# Patient Record
Sex: FEMALE | Race: WHITE | NOT HISPANIC OR LATINO | Employment: FULL TIME | URBAN - METROPOLITAN AREA
[De-identification: names, ages, dates, MRNs, and addresses within clinical notes are randomized per-mention and may not be internally consistent; named-entity substitution may affect disease eponyms.]

---

## 2024-09-06 ENCOUNTER — TELEPHONE (OUTPATIENT)
Dept: OBSTETRICS AND GYNECOLOGY | Facility: CLINIC | Age: 29
End: 2024-09-06

## 2024-09-06 NOTE — TELEPHONE ENCOUNTER
Let pt know that per clinical manager we cannot send in any pain medication since we have not seen her in office yet. She VU and states she has appt with her PCP.

## 2024-09-06 NOTE — TELEPHONE ENCOUNTER
Spoke with pt and she states she has an abscess tooth and had to have 1 pulled and has to have another pulled next week. She reports being in a lot of pain but her dentist did not prescribe any pain medication to help just recommended she call us. She reports taking 1000mg of tylenol every 6 hours and she is still having pain. I let her know it is unlikely that we can prescribe any medication since she has not been seen in the office but I would speak with provider and call back with recommendation. She VU

## 2024-09-06 NOTE — TELEPHONE ENCOUNTER
Pt called and stated she has her new ob appt with us oct 2nd however she is having dental issues right now and was told she needed to call her obgyn office to get prescribed anything stronger than ibuprofen. Would like a call back to discuss if there is anything she is able to take.

## 2024-09-07 ENCOUNTER — HOSPITAL ENCOUNTER (EMERGENCY)
Facility: HOSPITAL | Age: 29
Discharge: HOME | End: 2024-09-07
Attending: EMERGENCY MEDICINE
Payer: COMMERCIAL

## 2024-09-07 VITALS
HEART RATE: 82 BPM | DIASTOLIC BLOOD PRESSURE: 78 MMHG | SYSTOLIC BLOOD PRESSURE: 147 MMHG | OXYGEN SATURATION: 100 % | RESPIRATION RATE: 16 BRPM | TEMPERATURE: 98.5 F | WEIGHT: 197 LBS | HEIGHT: 62 IN | BODY MASS INDEX: 36.25 KG/M2

## 2024-09-07 DIAGNOSIS — K08.89 PAIN OF TOOTH SOCKET: ICD-10-CM

## 2024-09-07 DIAGNOSIS — K02.9 PAIN DUE TO DENTAL CARIES: Primary | ICD-10-CM

## 2024-09-07 PROCEDURE — 93005 ELECTROCARDIOGRAM TRACING: CPT | Performed by: EMERGENCY MEDICINE

## 2024-09-07 PROCEDURE — 25010000002 BUPIVACAINE (PF) 0.5 % SOLUTION: Performed by: EMERGENCY MEDICINE

## 2024-09-07 PROCEDURE — 99283 EMERGENCY DEPT VISIT LOW MDM: CPT

## 2024-09-07 RX ORDER — BUPIVACAINE HYDROCHLORIDE 5 MG/ML
10 INJECTION, SOLUTION EPIDURAL; INTRACAUDAL ONCE
Status: COMPLETED | OUTPATIENT
Start: 2024-09-07 | End: 2024-09-07

## 2024-09-07 RX ORDER — PRENATAL WITH FERROUS FUM AND FOLIC ACID 3080; 920; 120; 400; 22; 1.84; 3; 20; 10; 1; 12; 200; 27; 25; 2 [IU]/1; [IU]/1; MG/1; [IU]/1; MG/1; MG/1; MG/1; MG/1; MG/1; MG/1; UG/1; MG/1; MG/1; MG/1; MG/1
1 TABLET ORAL DAILY
COMMUNITY

## 2024-09-07 RX ORDER — OXYCODONE AND ACETAMINOPHEN 5; 325 MG/1; MG/1
1 TABLET ORAL EVERY 6 HOURS PRN
Qty: 6 TABLET | Refills: 0 | Status: SHIPPED | OUTPATIENT
Start: 2024-09-07

## 2024-09-07 RX ADMIN — BUPIVACAINE HYDROCHLORIDE 10 ML: 5 INJECTION, SOLUTION EPIDURAL; INTRACAUDAL at 10:31

## 2024-09-07 RX ADMIN — TOPICAL ANESTHETIC 1 SPRAY: 200 SPRAY DENTAL; PERIODONTAL at 10:31

## 2024-09-07 NOTE — Clinical Note
[facility] EMERGENCY DEPARTMENT [place]  [address]  [facility] [address]  Phone: [phone]  Fax: [phone]    Kathy Anthony was seen and treated in our emergency department on 9/7/2024.  She may return to work on 09/10/2024.         Thank you for choosing [facility].    Salvador Medellin MD

## 2024-09-07 NOTE — FSED PROVIDER NOTE
"Subjective  History of Present Illness:    Patient presents the emergency department with dental pain.  The patient reports that she has had this pain over the past week she is unsure of aggravating factors.  Patient had a dental extraction to the maxillary incisors.  Patient reports pain in this area as well as pain in the premolar on the mandibular side.  Patient appears to have had a filling in that tooth.  Patient reports severe dental pain she advises that her primary care doctor prescribed her hydrocodone; however the pharmacy did not have it available.  Patient reports that she went to [facility] but they advised her that they could do nothing for her pain.      Nurses Notes reviewed and agree, including vitals, allergies, social history and prior medical history.     REVIEW OF SYSTEMS:   Review of Systems   HENT:  Positive for dental problem.        History reviewed. No pertinent past medical history.    Allergies:    Patient has no known allergies.      History reviewed. No pertinent surgical history.      Social History     Socioeconomic History    Marital status:          History reviewed. No pertinent family history.    Objective  Physical Exam:  /78   Pulse 82   Temp 98.5 °F (36.9 °C) (Oral)   Resp 16   Ht 157.5 cm (62\")   Wt 89.4 kg (197 lb)   SpO2 100%   BMI 36.03 kg/m²      Physical Exam  Vitals and nursing note reviewed.   Constitutional:       Appearance: Normal appearance.   HENT:      Right Ear: External ear normal.      Left Ear: External ear normal.      Nose: Nose normal.      Mouth/Throat:      Mouth: Mucous membranes are moist.     Eyes:      Extraocular Movements: Extraocular movements intact.   Cardiovascular:      Rate and Rhythm: Normal rate and regular rhythm.   Pulmonary:      Effort: Pulmonary effort is normal.      Breath sounds: Normal breath sounds.   Musculoskeletal:         General: Normal range of motion.   Skin:     General: Skin is warm and dry. "   Neurological:      General: No focal deficit present.      Mental Status: She is alert.   Psychiatric:         Mood and Affect: Mood normal.         Behavior: Behavior normal.         Thought Content: Thought content normal.         Nerve Block    Date/Time: 9/7/2024 11:41 AM    Performed by: Salvador Medellin MD  Authorized by: Salvador Medellin MD      I performed a inferior alveolar nerve block to the left side.  I instilled 2 mL of bupivacaine 0.5% in the area.  Patient tolerated the procedure well and reported significant relief.    I performed a periapical dental block in the region of the prior dental extraction.  I instilled 2 mL of bupivacaine 0.5% in the area.  The patient reported significant relief.  T    ED Course:      I had a prolonged discussion with the patient regarding the risks and benefits of opioids during pregnancy.  I advised the patient that the least known safe drug for pain in pregnancy as Tylenol; however we did discuss that given the amount of severe pain that the patient is in that the benefits of a few oxycodone likely outweigh the risk.  Patient understands that I did not advise the patient that this was safe for pregnancy.  Patient is currently taking antibiotics therefore I doubt that further antibiotics would help the patient's pain.     Lab Results (last 24 hours)       ** No results found for the last 24 hours. **             No radiology results from the last 24 hrs       MDM     Amount and/or Complexity of Data Reviewed  Tests in the medicine section of CPT®: reviewed        Initial impression of presenting illness: Dental pain    DDX: includes but is not limited to: Periapical abscess, pulpitis, alveolar nerve pain    Plan    Patient arrives ambulatory with vitals interpreted by myself.     Pertinent features from physical exam: Patient very uncomfortable and tearful.    Initial diagnostic plan:     Results from initial plan were reviewed and interpreted by me revealing      Diagnostic information from other sources:     Interventions / Re-evaluation: Patient reports significant relief from nerve block    Medications   bupivacaine (PF) (MARCAINE) 0.5 % injection 10 mL (10 mL Injection Given 9/7/24 1031)   benzocaine (HURRICAINE) 20 % liquid solution 1 spray (1 spray Mouth/Throat Given 9/7/24 1031)       Results/clinical rationale were discussed with patient and patient's spouse    Consultations/Discussion of results with other physicians:     Data interpreted: Nursing notes reviewed, vital signs reviewed.     reviewed independently interpreted by me.  EKG independently interpreted by me.  O2 saturation:    Counseling: Discussed the results above with the patient regarding need for discharged home. Return precautions were given to patient and patient's spouse.  Patient understands and agrees plan of care.      -----  ED Disposition       ED Disposition   Discharge    Condition   Stable    Comment   --             Final diagnoses:   Pain due to dental caries   Pain of tooth socket      Your Follow-Up Providers       Go to  [facility] EMERGENCY DEPARTMENT [place].    Specialty: Emergency Medicine  Follow up details: If symptoms worsen  [address]  [facility] [address] [phone]             Dentist of choice In 3 days.                       Contact information for after-discharge care    Follow-up information has not been specified.                    Your medication list        START taking these medications        Instructions Last Dose Given Next Dose Due   oxyCODONE-acetaminophen 5-325 MG per tablet  Commonly known as: PERCOCET      Take 1 tablet by mouth Every 6 (Six) Hours As Needed for Moderate Pain or Severe Pain for up to 6 doses.              CONTINUE taking these medications        Instructions Last Dose Given Next Dose Due   Prenatal 27-1 27-1 MG tablet tablet      Take 1 tablet by mouth Daily.                 Where to Get  Your Medications        These medications were sent to [facility]/pharmacy #[id number] - [place], [place] - [address] - [phone] PH - [phone] FX  [address], [facility] [address]      Hours: 24-hours Phone: [phone]   oxyCODONE-acetaminophen 5-325 MG per tablet

## 2024-09-14 LAB
QT INTERVAL: 372 MS
QTC INTERVAL: 407 MS

## 2024-10-02 ENCOUNTER — INITIAL PRENATAL (OUTPATIENT)
Dept: OBSTETRICS AND GYNECOLOGY | Facility: CLINIC | Age: 29
End: 2024-10-02
Payer: COMMERCIAL

## 2024-10-02 VITALS — BODY MASS INDEX: 36.51 KG/M2 | SYSTOLIC BLOOD PRESSURE: 112 MMHG | DIASTOLIC BLOOD PRESSURE: 82 MMHG | WEIGHT: 199.6 LBS

## 2024-10-02 DIAGNOSIS — Z3A.09 9 WEEKS GESTATION OF PREGNANCY: Primary | ICD-10-CM

## 2024-10-02 DIAGNOSIS — R11.0 NAUSEA WITHOUT VOMITING: ICD-10-CM

## 2024-10-02 LAB
APPEARANCE UR: CLEAR
BACTERIA #/AREA URNS HPF: ABNORMAL /HPF
BILIRUB UR QL STRIP: NEGATIVE
CASTS URNS MICRO: ABNORMAL
COLOR UR: YELLOW
EPI CELLS #/AREA URNS HPF: ABNORMAL /HPF
GLUCOSE UR QL STRIP: NEGATIVE
HGB UR QL STRIP: NEGATIVE
KETONES UR QL STRIP: NEGATIVE
LEUKOCYTE ESTERASE UR QL STRIP: ABNORMAL
NITRITE UR QL STRIP: NEGATIVE
PH UR STRIP: 7 [PH] (ref 5–8)
PROT UR QL STRIP: NEGATIVE
RBC #/AREA URNS HPF: ABNORMAL /HPF
SP GR UR STRIP: 1.02 (ref 1–1.03)
UROBILINOGEN UR STRIP-MCNC: ABNORMAL MG/DL
WBC #/AREA URNS HPF: ABNORMAL /HPF

## 2024-10-02 RX ORDER — DOXYLAMINE SUCCINATE AND PYRIDOXINE HYDROCHLORIDE, DELAYED RELEASE TABLETS 10 MG/10 MG 10; 10 MG/1; MG/1
2 TABLET, DELAYED RELEASE ORAL
Qty: 60 TABLET | Refills: 5 | Status: SHIPPED | OUTPATIENT
Start: 2024-10-02

## 2024-10-02 RX ORDER — SENNOSIDES 8.6 MG
650 CAPSULE ORAL EVERY 8 HOURS PRN
COMMUNITY

## 2024-10-02 RX ORDER — FAMOTIDINE 40 MG/1
40 TABLET, FILM COATED ORAL DAILY
COMMUNITY

## 2024-10-02 NOTE — PROGRESS NOTES
Initial ob visit     CC- Here for care of pregnancy        Kathy Anthony is a 29 y.o. female, , who presents for her first obstetrical visit.  Patient's last menstrual period was 2024.. Her JOCY is 2025, by Last Menstrual Period. Current GA is 9w6d.     Initial positive test date : 24, UPT        Her periods are every 28  days.  Prior obstetric issues: G1 PP Hemorrhage  Patient's past medical history is significant for:  anxiety, depression, GERD, post partum hemorrhage   Family history of genetic issues (includes FOB): Trisomy 18 PAunt  Prior infections concerning in pregnancy (Rash, fever in last 2 weeks): No  Varicella Hx - history of chicken pox  Prior testing for Cystic Fibrosis Carrier or Sickle Cell Trait- None  Prepregnancy BMI - Body mass index is 36.51 kg/m².  History of STD: no  Hx of HSV for patient or partner: no  Ultrasound Today: yes    OB History    Para Term  AB Living   4 3 3 0 0 3   SAB IAB Ectopic Molar Multiple Live Births   0 0 0 0 0 3      # Outcome Date GA Lbr Oscar/2nd Weight Sex Type Anes PTL Lv   4 Current            3 Term 10/19/17 39w4d 06:46 / 00:08 3145 g (6 lb 14.9 oz) F Vaginal unsp EPI N JAY   2 Term 16 39w1d 03:14 / 00:16 3371 g (7 lb 6.9 oz) F Vag-Spont EPI Y JAY   1 Term 11 42w0d 16:00 / 00:45 3345 g (7 lb 6 oz) F Vag-Spont EPI N JAY      Complications: Other Excessive Bleeding, Postpartum Hemorrhage       Additional Pertinent History   Last Pap : 3/15/21 Result: negative HPV: not done. Her last HPV testing was unknown.     Last Completed Pap Smear       This patient has no relevant Health Maintenance data.          History of abnormal Pap smear: no  Family history of uterine, colon, breast, or ovarian cancer: no  Feelings of Anxiety or Depression: yes - anxiety and depression, off Lexapro and Wellbutrin for 6 months  Tobacco Usage?: No   Alcohol/Drug Use?: NO  Over the age of 35 at delivery: no  Genetic Screening: desires cell  free DNA  Flu Status: Declines    PMH    Current Outpatient Medications:     acetaminophen (TYLENOL) 650 MG 8 hr tablet, Take 1 tablet by mouth Every 8 (Eight) Hours As Needed for Mild Pain., Disp: , Rfl:     famotidine (PEPCID) 40 MG tablet, Take 1 tablet by mouth Daily., Disp: , Rfl:     ondansetron ODT (ZOFRAN-ODT) 4 MG disintegrating tablet, Place 1 tablet on the tongue Every 8 (Eight) Hours for nausea and vomiting. Please use sparingly during pregnancy., Disp: 16 tablet, Rfl: 0    Prenatal Vit-Fe Fumarate-FA (Prenatal 27-1) 27-1 MG tablet tablet, Take 1 tablet by mouth Daily., Disp: , Rfl:     doxylamine-pyridoxine (DICLEGIS) 10-10 MG tablet delayed-release EC tablet, Take 2 tablets by mouth every night at bedtime., Disp: 60 tablet, Rfl: 5     Past Medical History:   Diagnosis Date    Anxiety 2006    Depression 2007    GERD (gastroesophageal reflux disease) 2011    History of postpartum hemorrhage 11/2011    IBS (irritable bowel syndrome) 2017        Past Surgical History:   Procedure Laterality Date    MOUTH SURGERY  09/12/2024    ROOT CANAL      x3    WISDOM TOOTH EXTRACTION  05/2019       Review of Systems   Review of Systems    Patient Reports: excessive nausea , takes Zofran with mild relief, requesting Diclegis    Patient Denies:excessive vomiting and vaginal bleeding  All systems reviewed and otherwise normal.    I have reviewed and agree with the HPI, ROS, and historical information as entered above. Claudia Guallpa MD      /82   Wt 90.5 kg (199 lb 9.6 oz)   LMP 07/25/2024   BMI 36.51 kg/m²     The additional following portions of the patient's history were reviewed and updated as appropriate: allergies, current medications, past family history, past medical history, past social history, past surgical history, and problem list.    Physical Exam  General:  well developed; well nourished  no acute distress  mentation appropriate   Chest/Respiratory: No labored breathing, normal respiratory  effort, normal appearance, no respiratory noises noted   Heart:  normal rate, regular rhythm,  no murmurs, rubs, or gallops   Thyroid: normal to inspection and palpation   Breasts:  Not performed.   Abdomen: soft, non-tender; no masses  no umbilical or inguinal hernias are present  no hepato-splenomegaly   Pelvis: Not performed.        Assessment and Plan    Problem List Items Addressed This Visit    None  Visit Diagnoses       9 weeks gestation of pregnancy    -  Primary    Relevant Orders    Obstetric Panel    HIV-1 / O / 2 Ag / Antibody    Urine Culture - Urine, Urine, Clean Catch    Urinalysis With Microscopic - Urine, Clean Catch    Chlamydia trachomatis, Neisseria gonorrhoeae, PCR - Urine, Urine, Clean Catch    Urine Drug Screen - Urine, Clean Catch    RPR Qualitative with Reflex to Quant    LIQUID-BASED PAP SMEAR WITH HPV GENOTYPING REGARDLESS OF INTERPRETATION (STEVEN,COR,MAD)    [id number] PLUS Core+SCA+ESS - Blood,    Nausea without vomiting                Pregnancy at 9w6d  Reviewed routine prenatal care with the office and educational materials given  Discussed options for genetic testing including first trimester nuchal translucency screen, genetic disease carrier testing, quadruple screen, and NIPT  Return in about 4 weeks (around 10/30/2024).      Claudia Guallpa MD  10/02/2024

## 2024-10-03 LAB
ABO GROUP BLD: ABNORMAL
AMPHETAMINES UR QL SCN: NEGATIVE NG/ML
BARBITURATES UR QL SCN: NEGATIVE NG/ML
BASOPHILS # BLD AUTO: 0 X10E3/UL (ref 0–0.2)
BASOPHILS NFR BLD AUTO: 0 %
BENZODIAZ UR QL SCN: NEGATIVE NG/ML
BLD GP AB SCN SERPL QL: NEGATIVE
BZE UR QL SCN: NEGATIVE NG/ML
CANNABINOIDS UR QL SCN: NEGATIVE NG/ML
CREAT UR-MCNC: 90.7 MG/DL (ref 20–300)
EOSINOPHIL # BLD AUTO: 0.1 X10E3/UL (ref 0–0.4)
EOSINOPHIL NFR BLD AUTO: 1 %
ERYTHROCYTE [DISTWIDTH] IN BLOOD BY AUTOMATED COUNT: 12 % (ref 11.7–15.4)
HBV SURFACE AG SERPL QL IA: NEGATIVE
HCT VFR BLD AUTO: 42.2 % (ref 34–46.6)
HCV IGG SERPL QL IA: NON REACTIVE
HGB BLD-MCNC: 13.1 G/DL (ref 11.1–15.9)
HIV 1+2 AB+HIV1 P24 AG SERPL QL IA: NON REACTIVE
IMM GRANULOCYTES # BLD AUTO: 0 X10E3/UL (ref 0–0.1)
IMM GRANULOCYTES NFR BLD AUTO: 0 %
LABORATORY COMMENT REPORT: NORMAL
LYMPHOCYTES # BLD AUTO: 2.2 X10E3/UL (ref 0.7–3.1)
LYMPHOCYTES NFR BLD AUTO: 23 %
MCH RBC QN AUTO: 29.9 PG (ref 26.6–33)
MCHC RBC AUTO-ENTMCNC: 31 G/DL (ref 31.5–35.7)
MCV RBC AUTO: 96 FL (ref 79–97)
METHADONE UR QL SCN: NEGATIVE NG/ML
MONOCYTES # BLD AUTO: 0.7 X10E3/UL (ref 0.1–0.9)
MONOCYTES NFR BLD AUTO: 7 %
NEUTROPHILS # BLD AUTO: 6.5 X10E3/UL (ref 1.4–7)
NEUTROPHILS NFR BLD AUTO: 69 %
OPIATES UR QL SCN: NEGATIVE NG/ML
OXYCODONE+OXYMORPHONE UR QL SCN: NEGATIVE NG/ML
PCP UR QL: NEGATIVE NG/ML
PH UR: 6.9 [PH] (ref 4.5–8.9)
PLATELET # BLD AUTO: 371 X10E3/UL (ref 150–450)
PROPOXYPH UR QL SCN: NEGATIVE NG/ML
RBC # BLD AUTO: 4.38 X10E6/UL (ref 3.77–5.28)
RH BLD: POSITIVE
RPR SER QL: NON REACTIVE
RUBV IGG SERPL IA-ACNC: <0.9 INDEX
WBC # BLD AUTO: 9.4 X10E3/UL (ref 3.4–10.8)

## 2024-10-04 LAB
C TRACH RRNA SPEC QL NAA+PROBE: NEGATIVE
N GONORRHOEA RRNA SPEC QL NAA+PROBE: NEGATIVE

## 2024-10-04 RX ORDER — NITROFURANTOIN 25; 75 MG/1; MG/1
100 CAPSULE ORAL 2 TIMES DAILY
Qty: 14 CAPSULE | Refills: 0 | Status: SHIPPED | OUTPATIENT
Start: 2024-10-04 | End: 2024-10-11

## 2024-10-04 NOTE — PROGRESS NOTES
Please notify of urine culture positive for UTI.  Antibiotic has been sent in. Awaiting final culture.

## 2024-10-07 LAB
5P15 DELETION (CRI-DU-CHAT): NOT DETECTED
BACTERIA UR CULT: ABNORMAL
BACTERIA UR CULT: ABNORMAL
CFDNA.FET/CFDNA.TOTAL SFR FETUS: NORMAL %
CITATION REF LAB TEST: NORMAL
FET 13+18+21+X+Y ANEUP PLAS.CFDNA: NEGATIVE
FET 1P36 DEL RISK WBC.DNA+CFDNA QL: NOT DETECTED
FET 22Q11.2 DEL RISK WBC.DNA+CFDNA QL: NOT DETECTED
FET CHR 11Q23 DEL PLAS.CFDNA QL: NOT DETECTED
FET CHR 15Q11 DEL PLAS.CFDNA QL: NOT DETECTED
FET CHR 21 TS PLAS.CFDNA QL: NEGATIVE
FET CHR 4P16 DEL PLAS.CFDNA QL: NOT DETECTED
FET CHR 8Q24 DEL PLAS.CFDNA QL: NOT DETECTED
FET MS X RISK WBC.DNA+CFDNA QL: NOT DETECTED
FET SEX PLAS.CFDNA DOSAGE CFDNA: NORMAL
FET TS 13 RISK PLAS.CFDNA QL: NEGATIVE
FET TS 18 RISK WBC.DNA+CFDNA QL: NEGATIVE
FET X + Y ANEUP RISK PLAS.CFDNA SEQ-IMP: NOT DETECTED
GA EST FROM CONCEPTION DATE: NORMAL D
GESTATIONAL AGE > 9:: YES
LAB DIRECTOR NAME PROVIDER: NORMAL
LAB DIRECTOR NAME PROVIDER: NORMAL
LABORATORY COMMENT REPORT: NORMAL
LIMITATIONS OF THE TEST: NORMAL
NEGATIVE PREDICTIVE VALUE: NORMAL
NOTE: NORMAL
OTHER ANTIBIOTIC SUSC ISLT: ABNORMAL
PERFORMANCE CHARACTERISTICS: NORMAL
POSITIVE PREDICTIVE VALUE: NORMAL
REF LAB TEST METHOD: NORMAL
REF LAB TEST METHOD: NORMAL
TEST PERFORMANCE INFO SPEC: NORMAL
TRIOSOMY 16: NOT DETECTED
TRISOMY 22: NOT DETECTED

## 2024-10-31 ENCOUNTER — ROUTINE PRENATAL (OUTPATIENT)
Dept: OBSTETRICS AND GYNECOLOGY | Facility: CLINIC | Age: 29
End: 2024-10-31
Payer: COMMERCIAL

## 2024-10-31 VITALS — BODY MASS INDEX: 36.91 KG/M2 | DIASTOLIC BLOOD PRESSURE: 82 MMHG | SYSTOLIC BLOOD PRESSURE: 128 MMHG | WEIGHT: 201.8 LBS

## 2024-10-31 DIAGNOSIS — O23.40 URINARY TRACT INFECTION IN MOTHER DURING PREGNANCY, ANTEPARTUM: Primary | ICD-10-CM

## 2024-10-31 DIAGNOSIS — Z34.92 PRENATAL CARE IN SECOND TRIMESTER: ICD-10-CM

## 2024-10-31 LAB
GLUCOSE UR STRIP-MCNC: NEGATIVE MG/DL
PROT UR STRIP-MCNC: NEGATIVE MG/DL

## 2024-10-31 NOTE — PROGRESS NOTES
OB FOLLOW UP  CC- Here for care of pregnancy        Kathy Anthony is a 29 y.o.  14w0d patient being seen today for her obstetrical follow up visit. Patient reports fatigue and nausea. Patient is taking Diclegis at night and is somewhat helping. Patient has not been taking Zofran.     TAMANNA for UTI today.     Her prenatal care is complicated by (and status) :   Patient Active Problem List   Diagnosis    History of postpartum hemorrhage       Genetic testing?: already completed and was normal.  NOB labs reviewed  Flu Status: Already given in current flu season  Ultrasound Today: No    ROS -   Patient Denies: leaking of fluid, vaginal bleeding, dysuria, excessive vomiting, and more than 6 contractions per hour  All other systems reviewed and are negative.     The additional following portions of the patient's history were reviewed and updated as appropriate: allergies and current medications.    I have reviewed and agree with the HPI, ROS, and historical information as entered above. Claudia Guallpa MD          /82   Wt 91.5 kg (201 lb 12.8 oz)   LMP 2024   BMI 36.91 kg/m²         EXAM:     Prenatal Vitals  BP: 128/82  Weight: 91.5 kg (201 lb 12.8 oz)   Fetal Heart Rate: pos          Urine Glucose Read-only: Negative  Urine Protein Read-only: Negative       Assessment and Plan    Problem List Items Addressed This Visit    None  Visit Diagnoses       Urinary tract infection in mother during pregnancy, antepartum    -  Primary    Relevant Orders    POC Urinalysis Dipstick (Completed)    Urine Culture - Urine, Urine, Clean Catch    Prenatal care in second trimester        Relevant Orders    POC Urinalysis Dipstick (Completed)    Urine Culture - Urine, Urine, Clean Catch            Pregnancy at 14w0d  Labs reviewed from New OB Visit.  Counseled on genetic testing, carrier status and option for NT screen  Activity and Exercise discussed.  Patient is on Prenatal vitamins  Return in about 3 weeks  (around 11/21/2024).    Claudia Guallpa MD  10/31/2024

## 2024-11-02 LAB
BACTERIA UR CULT: NORMAL
BACTERIA UR CULT: NORMAL

## 2024-11-05 ENCOUNTER — TELEPHONE (OUTPATIENT)
Dept: OBSTETRICS AND GYNECOLOGY | Facility: CLINIC | Age: 29
End: 2024-11-05
Payer: COMMERCIAL

## 2024-11-05 ENCOUNTER — HOSPITAL ENCOUNTER (EMERGENCY)
Facility: HOSPITAL | Age: 29
Discharge: HOME | End: 2024-11-05
Attending: STUDENT IN AN ORGANIZED HEALTH CARE EDUCATION/TRAINING PROGRAM | Admitting: STUDENT IN AN ORGANIZED HEALTH CARE EDUCATION/TRAINING PROGRAM
Payer: COMMERCIAL

## 2024-11-05 VITALS
HEIGHT: 62 IN | HEART RATE: 91 BPM | TEMPERATURE: 98.2 F | DIASTOLIC BLOOD PRESSURE: 71 MMHG | WEIGHT: 201 LBS | SYSTOLIC BLOOD PRESSURE: 115 MMHG | OXYGEN SATURATION: 98 % | BODY MASS INDEX: 36.99 KG/M2 | RESPIRATION RATE: 16 BRPM

## 2024-11-05 DIAGNOSIS — O21.9 NAUSEA AND VOMITING DURING PREGNANCY: Primary | ICD-10-CM

## 2024-11-05 DIAGNOSIS — O99.891 BACTERIURIA DURING PREGNANCY: ICD-10-CM

## 2024-11-05 DIAGNOSIS — R82.71 BACTERIURIA DURING PREGNANCY: ICD-10-CM

## 2024-11-05 LAB
ALBUMIN SERPL-MCNC: 3.9 G/DL (ref 3.5–5.2)
ALBUMIN/GLOB SERPL: 1.3 G/DL
ALP SERPL-CCNC: 74 U/L (ref 39–117)
ALT SERPL W P-5'-P-CCNC: 10 U/L (ref 1–33)
AMORPH URATE CRY URNS QL MICRO: ABNORMAL /HPF
ANION GAP SERPL CALCULATED.3IONS-SCNC: 14.5 MMOL/L (ref 5–15)
AST SERPL-CCNC: 22 U/L (ref 1–32)
BACTERIA UR QL AUTO: ABNORMAL /HPF
BASOPHILS # BLD AUTO: 0.02 10*3/MM3 (ref 0–0.2)
BASOPHILS NFR BLD AUTO: 0.2 % (ref 0–1.5)
BILIRUB SERPL-MCNC: 0.2 MG/DL (ref 0–1.2)
BILIRUB UR QL STRIP: NEGATIVE
BUN SERPL-MCNC: 7 MG/DL (ref 6–20)
BUN/CREAT SERPL: 12.7 (ref 7–25)
CALCIUM SPEC-SCNC: 9 MG/DL (ref 8.6–10.5)
CHLORIDE SERPL-SCNC: 101 MMOL/L (ref 98–107)
CLARITY UR: CLEAR
CO2 SERPL-SCNC: 21.5 MMOL/L (ref 22–29)
COLOR UR: YELLOW
CREAT SERPL-MCNC: 0.55 MG/DL (ref 0.57–1)
DEPRECATED RDW RBC AUTO: 41.2 FL (ref 37–54)
EGFRCR SERPLBLD CKD-EPI 2021: 125.9 ML/MIN/1.73
EOSINOPHIL # BLD AUTO: 0.12 10*3/MM3 (ref 0–0.4)
EOSINOPHIL NFR BLD AUTO: 1.1 % (ref 0.3–6.2)
ERYTHROCYTE [DISTWIDTH] IN BLOOD BY AUTOMATED COUNT: 12.3 % (ref 12.3–15.4)
GLOBULIN UR ELPH-MCNC: 3.1 GM/DL
GLUCOSE SERPL-MCNC: 105 MG/DL (ref 65–99)
GLUCOSE UR STRIP-MCNC: NEGATIVE MG/DL
HCT VFR BLD AUTO: 39.7 % (ref 34–46.6)
HGB BLD-MCNC: 13.2 G/DL (ref 12–15.9)
HGB UR QL STRIP.AUTO: NEGATIVE
HOLD SPECIMEN: NORMAL
HYALINE CASTS UR QL AUTO: ABNORMAL /LPF
IMM GRANULOCYTES # BLD AUTO: 0.04 10*3/MM3 (ref 0–0.05)
IMM GRANULOCYTES NFR BLD AUTO: 0.4 % (ref 0–0.5)
KETONES UR QL STRIP: ABNORMAL
LEUKOCYTE ESTERASE UR QL STRIP.AUTO: ABNORMAL
LIPASE SERPL-CCNC: 15 U/L (ref 13–60)
LYMPHOCYTES # BLD AUTO: 2.1 10*3/MM3 (ref 0.7–3.1)
LYMPHOCYTES NFR BLD AUTO: 18.4 % (ref 19.6–45.3)
MCH RBC QN AUTO: 30.2 PG (ref 26.6–33)
MCHC RBC AUTO-ENTMCNC: 33.2 G/DL (ref 31.5–35.7)
MCV RBC AUTO: 90.8 FL (ref 79–97)
MONOCYTES # BLD AUTO: 0.89 10*3/MM3 (ref 0.1–0.9)
MONOCYTES NFR BLD AUTO: 7.8 % (ref 5–12)
NEUTROPHILS NFR BLD AUTO: 72.1 % (ref 42.7–76)
NEUTROPHILS NFR BLD AUTO: 8.22 10*3/MM3 (ref 1.7–7)
NITRITE UR QL STRIP: NEGATIVE
PH UR STRIP.AUTO: 7 [PH] (ref 5–8)
PLATELET # BLD AUTO: 374 10*3/MM3 (ref 140–450)
PMV BLD AUTO: 10 FL (ref 6–12)
POTASSIUM SERPL-SCNC: 3.5 MMOL/L (ref 3.5–5.2)
PROT SERPL-MCNC: 7 G/DL (ref 6–8.5)
PROT UR QL STRIP: ABNORMAL
RBC # BLD AUTO: 4.37 10*6/MM3 (ref 3.77–5.28)
RBC # UR STRIP: ABNORMAL /HPF
REF LAB TEST METHOD: ABNORMAL
SODIUM SERPL-SCNC: 137 MMOL/L (ref 136–145)
SP GR UR STRIP: 1.02 (ref 1–1.03)
SQUAMOUS #/AREA URNS HPF: ABNORMAL /HPF
UROBILINOGEN UR QL STRIP: ABNORMAL
WBC # UR STRIP: ABNORMAL /HPF
WBC NRBC COR # BLD AUTO: 11.39 10*3/MM3 (ref 3.4–10.8)
WHOLE BLOOD HOLD COAG: NORMAL
WHOLE BLOOD HOLD SPECIMEN: NORMAL

## 2024-11-05 PROCEDURE — 25010000002 METOCLOPRAMIDE PER 10 MG: Performed by: PHYSICIAN ASSISTANT

## 2024-11-05 PROCEDURE — 85025 COMPLETE CBC W/AUTO DIFF WBC: CPT | Performed by: PHYSICIAN ASSISTANT

## 2024-11-05 PROCEDURE — 81001 URINALYSIS AUTO W/SCOPE: CPT | Performed by: PHYSICIAN ASSISTANT

## 2024-11-05 PROCEDURE — 99283 EMERGENCY DEPT VISIT LOW MDM: CPT

## 2024-11-05 PROCEDURE — 96374 THER/PROPH/DIAG INJ IV PUSH: CPT

## 2024-11-05 PROCEDURE — 83690 ASSAY OF LIPASE: CPT | Performed by: PHYSICIAN ASSISTANT

## 2024-11-05 PROCEDURE — 80053 COMPREHEN METABOLIC PANEL: CPT | Performed by: PHYSICIAN ASSISTANT

## 2024-11-05 PROCEDURE — 25810000003 SODIUM CHLORIDE 0.9 % SOLUTION: Performed by: PHYSICIAN ASSISTANT

## 2024-11-05 RX ORDER — METOCLOPRAMIDE 10 MG/1
10 TABLET ORAL EVERY 8 HOURS PRN
Qty: 10 TABLET | Refills: 0 | Status: SHIPPED | OUTPATIENT
Start: 2024-11-05

## 2024-11-05 RX ORDER — CEFDINIR 300 MG/1
300 CAPSULE ORAL 2 TIMES DAILY
Qty: 14 CAPSULE | Refills: 0 | Status: SHIPPED | OUTPATIENT
Start: 2024-11-05 | End: 2024-11-12

## 2024-11-05 RX ORDER — METOCLOPRAMIDE HYDROCHLORIDE 5 MG/ML
10 INJECTION INTRAMUSCULAR; INTRAVENOUS ONCE
Status: COMPLETED | OUTPATIENT
Start: 2024-11-05 | End: 2024-11-05

## 2024-11-05 RX ORDER — SODIUM CHLORIDE 0.9 % (FLUSH) 0.9 %
10 SYRINGE (ML) INJECTION AS NEEDED
Status: DISCONTINUED | OUTPATIENT
Start: 2024-11-05 | End: 2024-11-05 | Stop reason: HOSPADM

## 2024-11-05 RX ADMIN — SODIUM CHLORIDE 1000 ML: 9 INJECTION, SOLUTION INTRAVENOUS at 11:51

## 2024-11-05 RX ADMIN — METOCLOPRAMIDE 10 MG: 5 INJECTION, SOLUTION INTRAMUSCULAR; INTRAVENOUS at 11:54

## 2024-11-05 NOTE — Clinical Note
[facility] EMERGENCY DEPARTMENT [place]  [address]  [facility] [address]  Phone: [phone]  Fax: [phone]    Kathy Anthony was seen and treated in our emergency department on 11/5/2024.  She may return to work on 11/06/2024.         Thank you for choosing [facility].    Candi Kelsey PA-C

## 2024-11-05 NOTE — TELEPHONE ENCOUNTER
Pt states is not able to keep anything down for 2 days, she has questions, no other symptoms at time of call

## 2024-11-05 NOTE — TELEPHONE ENCOUNTER
Patient of Dr. Guallpa;  @ 14w 5d. LOV 10/31/24.  Returned patient's call.   States she has not been able to keep anything down since 24.  States she vomits any time that she puts anything in her mouth. She is not even able to retain sips of water.   States Zofran does not help and she is not able to retain Diclegis.   She currently takes Diclegis 2 tablets at HS; asking if she can take it more often.  Admits her urine output is dark and thinks she is dehydrated.  Discussed that taking Diclegis more often will not help if she is not able to retain it.   Advised her to go to the ER; she may need IV fluids.   I will check with Dr. Guallpa about taking Diclegis more often when patient is able to retain it.   Patient v/u and agreed.

## 2024-11-05 NOTE — FSED PROVIDER NOTE
Subjective  History of Present Illness:    29-year-old female PMH GERD, IBS, anxiety, depression presents to ED for evaluation of nausea and vomiting during second trimester pregnancy.  Patient reports approximately 14 weeks gestation pregnancy for which she follows with Dr. Hester.  States since Saturday she has had persistent nausea and vomiting is unable to keep anything down.  Patient is still urinating, no dysuria but notes that her urine is dark.  She denies any fevers, chills, abdominal pain or cramping, vaginal bleeding or discharge.  Patient has been taking Diclegis and Zofran at home without improvement.  Last doses of antiemetics yesterday.      Nurses Notes reviewed and agree, including vitals, allergies, social history and prior medical history.     REVIEW OF SYSTEMS: All systems reviewed and not pertinent unless noted.  Review of Systems   Gastrointestinal:  Positive for nausea and vomiting. Negative for abdominal distention.   Genitourinary:  Negative for difficulty urinating, dysuria and pelvic pain.   All other systems reviewed and are negative.      Past Medical History:   Diagnosis Date    Anxiety     Depression     GERD (gastroesophageal reflux disease)     History of postpartum hemorrhage 2011    IBS (irritable bowel syndrome)        Allergies:    Patient has no known allergies.      Past Surgical History:   Procedure Laterality Date    MOUTH SURGERY  2024    ROOT CANAL      x3    WISDOM TOOTH EXTRACTION  2019         Social History     Socioeconomic History    Marital status:    Tobacco Use    Smoking status: Never   Vaping Use    Vaping status: Never Used   Substance and Sexual Activity    Alcohol use: Not Currently     Comment: rarely    Drug use: Never    Sexual activity: Yes     Partners: Male         Family History   Problem Relation Age of Onset    Stroke Father     Hypertension Father     Heart disease Father     Hypertension Mother      labor  "Mother     Stroke Maternal Grandmother     Diabetes Maternal Grandmother     Stroke Maternal Grandfather        Objective  Physical Exam:  /71   Pulse 81   Temp 98.2 °F (36.8 °C) (Oral)   Resp 16   Ht 157.5 cm (62\")   Wt 91.2 kg (201 lb)   LMP 07/25/2024   SpO2 100%   BMI 36.76 kg/m²      Physical Exam  Vitals and nursing note reviewed.   Constitutional:       General: She is not in acute distress.  HENT:      Head: Normocephalic and atraumatic.      Mouth/Throat:      Mouth: Mucous membranes are dry.   Cardiovascular:      Rate and Rhythm: Normal rate and regular rhythm.   Pulmonary:      Effort: Pulmonary effort is normal. No respiratory distress.      Breath sounds: Normal breath sounds.   Abdominal:      Palpations: Abdomen is soft.      Tenderness: There is no abdominal tenderness. There is no guarding or rebound.   Skin:     General: Skin is warm and dry.   Neurological:      Mental Status: She is alert.      Comments: Awake and alert   Psychiatric:         Mood and Affect: Mood normal.         Behavior: Behavior normal.         Procedures    ED Course:         Lab Results (last 24 hours)       Procedure Component Value Units Date/Time    CBC & Differential [[id number]]  (Abnormal) Collected: 11/05/24 1148    Specimen: Blood Updated: 11/05/24 1155    Narrative:      The following orders were created for panel order CBC & Differential.  Procedure                               Abnormality         Status                     ---------                               -----------         ------                     CBC Auto Differential[[id number]]        Abnormal            Final result                 Please view results for these tests on the individual orders.    Comprehensive Metabolic Panel [[id number]]  (Abnormal) Collected: 11/05/24 1148    Specimen: Blood Updated: 11/05/24 1211     Glucose 105 mg/dL      BUN 7 mg/dL      Creatinine 0.55 mg/dL      Sodium 137 mmol/L      Potassium 3.5 mmol/L      " Chloride 101 mmol/L      CO2 21.5 mmol/L      Calcium 9.0 mg/dL      Total Protein 7.0 g/dL      Albumin 3.9 g/dL      ALT (SGPT) 10 U/L      AST (SGOT) 22 U/L      Alkaline Phosphatase 74 U/L      Total Bilirubin 0.2 mg/dL      Globulin 3.1 gm/dL      A/G Ratio 1.3 g/dL      BUN/Creatinine Ratio 12.7     Anion Gap 14.5 mmol/L      eGFR 125.9 mL/min/1.73     Narrative:      GFR Normal >60  Chronic Kidney Disease <60  Kidney Failure <15      Lipase [[id number]]  (Normal) Collected: 11/05/24 1148    Specimen: Blood Updated: 11/05/24 1211     Lipase 15 U/L     CBC Auto Differential [[id number]]  (Abnormal) Collected: 11/05/24 1148    Specimen: Blood Updated: 11/05/24 1155     WBC 11.39 10*3/mm3      RBC 4.37 10*6/mm3      Hemoglobin 13.2 g/dL      Hematocrit 39.7 %      MCV 90.8 fL      MCH 30.2 pg      MCHC 33.2 g/dL      RDW 12.3 %      RDW-SD 41.2 fl      MPV 10.0 fL      Platelets 374 10*3/mm3      Neutrophil % 72.1 %      Lymphocyte % 18.4 %      Monocyte % 7.8 %      Eosinophil % 1.1 %      Basophil % 0.2 %      Immature Grans % 0.4 %      Neutrophils, Absolute 8.22 10*3/mm3      Lymphocytes, Absolute 2.10 10*3/mm3      Monocytes, Absolute 0.89 10*3/mm3      Eosinophils, Absolute 0.12 10*3/mm3      Basophils, Absolute 0.02 10*3/mm3      Immature Grans, Absolute 0.04 10*3/mm3     Urinalysis With Microscopic If Indicated (No Culture) - Urine, Clean Catch [[id number]]  (Abnormal) Collected: 11/05/24 1227    Specimen: Urine, Clean Catch Updated: 11/05/24 1237     Color, UA Yellow     Appearance, UA Clear     pH, UA 7.0     Specific Gravity, UA 1.020     Glucose, UA Negative     Ketones, UA Trace     Bilirubin, UA Negative     Blood, UA Negative     Protein, UA Trace     Leuk Esterase, UA Moderate (2+)     Nitrite, UA Negative     Urobilinogen, UA 2.0 E.U./dL    Urinalysis, Microscopic Only - Urine, Clean Catch [[id number]]  (Abnormal) Collected: 11/05/24 1227    Specimen: Urine, Clean Catch Updated: 11/05/24 1241      RBC, UA None Seen /HPF      WBC, UA 6-10 /HPF      Bacteria, UA 2+ /HPF      Squamous Epithelial Cells, UA 13-20 /HPF      Hyaline Casts, UA None Seen /LPF      Amorphous Crystals, UA Moderate/2+ /HPF      Methodology Manual Light Microscopy             No radiology results from the last 24 hrs       MDM      Initial impression of presenting illness: 29-year-old female presents to ED for evaluation of nausea and vomiting during second trimester pregnancy.  No abdominal pain, vaginal bleeding or discharge.    DDX: includes but is not limited to: Hyperemesis, non-intractable versus intractable nausea and vomiting, UTI, dehydration, acute electrolyte normality    Patient arrives afebrile with stable vitals interpreted by myself.     Pertinent features from physical exam: Mucosal membranes are dry.  Abdomen soft and nontender without any rebound or guarding.    Initial diagnostic plan: CBC, CMP, lipase, UA    Results from initial plan were reviewed and interpreted by me revealing mild leukocytosis of 11.39.  Chemistry panel is unremarkable.  Patient does have some bacteruria upon urinalysis however sample does have some contamination.  Given that patient is pregnant we will go ahead and cover for any bacteruria during pregnancy.    Interventions: Medications administered as below    Medications   sodium chloride 0.9 % flush 10 mL (has no administration in time range)   sodium chloride 0.9 % bolus 1,000 mL (0 mL Intravenous Stopped 11/5/24 1227)   metoclopramide (REGLAN) injection 10 mg (10 mg Intravenous Given 11/5/24 1154)       Reevaluation: Upon reevaluation patient reports complete resolution in nausea and vomiting following Reglan.  Feeling better following IV fluids.  She is tolerating p.o. fluids and solids without any complication.  Will discharge home with prescription for Reglan and cefdinir for bacteria.  She will follow close with PCP and OB.  Return precautions given for worsening symptoms or  concerns.    -----  ED Disposition       ED Disposition   Discharge    Condition   Stable    Comment   --             Final diagnoses:   Nausea and vomiting during pregnancy   Bacteriuria during pregnancy      Your Follow-Up Providers       Cirilo Roberto APRN.    Specialty: Nurse Practitioner  [address] [facility]  [address]  [facility] [address]  [phone]               Claudia Guallpa MD.    Specialties: Obstetrics and Gynecology, Gynecology  [address] [facility]  [address]  [facility] [id number] [phone]               Go to  [facility] EMERGENCY DEPARTMENT [place].    Specialty: Emergency Medicine  Follow up details: If symptoms worsen  [address] [facility] [address]  [facility] [address] [phone]                     Contact information for after-discharge care    Follow-up information has not been specified.                    Your medication list        START taking these medications        Instructions Last Dose Given Next Dose Due   cefdinir 300 MG capsule  Commonly known as: OMNICEF      Take 1 capsule by mouth 2 (Two) Times a Day for 7 days.       metoclopramide 10 MG tablet  Commonly known as: Reglan      Take 1 tablet by mouth Every 8 (Eight) Hours As Needed for nausea and vomiting.              CONTINUE taking these medications        Instructions Last Dose Given Next Dose Due   acetaminophen 650 MG 8 hr tablet  Commonly known as: TYLENOL      Take 1 tablet by mouth Every 8 (Eight) Hours As Needed for Mild Pain.       doxylamine-pyridoxine 10-10 MG tablet delayed-release EC tablet  Commonly known as: DICLEGIS      Take 2 tablets by mouth every night at bedtime.       famotidine 40 MG tablet  Commonly known as: PEPCID      Take 1 tablet by mouth Daily.       ondansetron ODT 4 MG disintegrating tablet  Commonly known as: ZOFRAN-ODT      Place 1 tablet on the tongue Every 8 (Eight) Hours for nausea and vomiting. Please use sparingly during pregnancy.       Prenatal 27-1 27-1  MG tablet tablet      Take 1 tablet by mouth Daily.                 Where to Get Your Medications        These medications were sent to [facility] - [address], [address], [place] [address]      Hours: Monday to Friday 9 AM to 5:30 PM Phone: [phone]   cefdinir 300 MG capsule  metoclopramide 10 MG tablet

## 2024-11-05 NOTE — DISCHARGE INSTRUCTIONS
Take medications as prescribed. [place] diet and progress as tolerated. Close follow up with PCP and OB. Call offices to schedule appts. Return to ED for any worsening symptoms or concerns.

## 2024-11-05 NOTE — Clinical Note
[facility] EMERGENCY DEPARTMENT [place]  [address]  [facility] [address]  Phone: [phone]  Fax: [phone]    Spouse accompanied Kathy Anthony to the emergency department on 11/5/2024. They may return to work on 11/06/2024.        Thank you for choosing [facility].    Candi Kelsey PA-C

## 2024-11-05 NOTE — TELEPHONE ENCOUNTER
Discussed with MAYLIN Joshua; okay for patient to add dose of Diclegis in the mornings as needed when she is able to tolerate it.   Informed patient; she v/u and agreed.

## 2024-11-14 ENCOUNTER — TELEPHONE (OUTPATIENT)
Dept: OBSTETRICS AND GYNECOLOGY | Facility: CLINIC | Age: 29
End: 2024-11-14
Payer: COMMERCIAL

## 2024-11-14 NOTE — TELEPHONE ENCOUNTER
Caller: Ktahy Anthony    Relationship to patient: Self    Best call back number: [phone] (home)         Type of visit: OB FOLLOW UP    Requested date: 11/21/24 JUST A LATER TIME IF AVAILABLE     If rescheduling, when is the original appointment: 11/21/24 @ 10:40

## 2024-11-21 ENCOUNTER — ROUTINE PRENATAL (OUTPATIENT)
Dept: OBSTETRICS AND GYNECOLOGY | Facility: CLINIC | Age: 29
End: 2024-11-21
Payer: COMMERCIAL

## 2024-11-21 VITALS — WEIGHT: 208.6 LBS | SYSTOLIC BLOOD PRESSURE: 116 MMHG | BODY MASS INDEX: 38.15 KG/M2 | DIASTOLIC BLOOD PRESSURE: 74 MMHG

## 2024-11-21 DIAGNOSIS — Z34.90 PRENATAL CARE, ANTEPARTUM: Primary | ICD-10-CM

## 2024-11-21 LAB
GLUCOSE UR STRIP-MCNC: NEGATIVE MG/DL
PROT UR STRIP-MCNC: NEGATIVE MG/DL

## 2024-12-12 ENCOUNTER — ROUTINE PRENATAL (OUTPATIENT)
Dept: OBSTETRICS AND GYNECOLOGY | Facility: CLINIC | Age: 29
End: 2024-12-12
Payer: COMMERCIAL

## 2024-12-12 VITALS — WEIGHT: 212.8 LBS | BODY MASS INDEX: 38.92 KG/M2 | SYSTOLIC BLOOD PRESSURE: 122 MMHG | DIASTOLIC BLOOD PRESSURE: 74 MMHG

## 2024-12-12 DIAGNOSIS — Z34.92 PRENATAL CARE IN SECOND TRIMESTER: Primary | ICD-10-CM

## 2024-12-12 LAB
GLUCOSE UR STRIP-MCNC: NEGATIVE MG/DL
PROT UR STRIP-MCNC: NEGATIVE MG/DL

## 2024-12-12 NOTE — PROGRESS NOTES
OB FOLLOW UP  CC- Here for care of pregnancy        Kathy Anthony is a 29 y.o.  20w0d patient being seen today for her obstetrical follow up visit. Patient reports headaches and mild nausea. Patient reports having a headache that will last for 1-2 days at a time. Patient states that Tylenol helps HA's.      Her prenatal care is complicated by (and status) : see below.  Patient Active Problem List   Diagnosis    History of postpartum hemorrhage       Flu Status: Already given in current flu season  Ultrasound Today: No  AFP was declined.    ROS -     Patient Denies: leaking of fluid, vaginal bleeding, dysuria, excessive vomiting, and more than 6 contractions per hour  Fetal Movement : Yes  All other systems reviewed and are negative.       The additional following portions of the patient's history were reviewed and updated as appropriate: allergies and current medications.      I have reviewed and agree with the HPI, ROS, and historical information as entered above. Claudia Guallpa MD      /74   Wt 96.5 kg (212 lb 12.8 oz)   LMP 2024   BMI 38.92 kg/m²       EXAM:     Prenatal Vitals  BP: 122/74  Weight: 96.5 kg (212 lb 12.8 oz)   Fetal Heart Rate: 138/US          Urine Glucose Read-only: Negative  Urine Protein Read-only: Negative       Assessment and Plan    Problem List Items Addressed This Visit    None  Visit Diagnoses       Prenatal care in second trimester    -  Primary    Relevant Orders    POC Urinalysis Dipstick (Completed)            Pregnancy at 20w0d  Anatomy scan today is complete and appear within normal limits.  Fetal status reassuring.   Activity and Exercise discussed.  Patient is on Prenatal vitamins  Return in about 4 weeks (around 2025).      Claudia Guallpa MD  2024

## 2025-01-09 ENCOUNTER — ROUTINE PRENATAL (OUTPATIENT)
Dept: OBSTETRICS AND GYNECOLOGY | Facility: CLINIC | Age: 30
End: 2025-01-09
Payer: COMMERCIAL

## 2025-01-09 VITALS — DIASTOLIC BLOOD PRESSURE: 70 MMHG | BODY MASS INDEX: 39.47 KG/M2 | WEIGHT: 215.8 LBS | SYSTOLIC BLOOD PRESSURE: 110 MMHG

## 2025-01-09 DIAGNOSIS — Z34.90 PRENATAL CARE, ANTEPARTUM: Primary | ICD-10-CM

## 2025-01-09 LAB
GLUCOSE UR STRIP-MCNC: NEGATIVE MG/DL
PROT UR STRIP-MCNC: NEGATIVE MG/DL

## 2025-01-09 NOTE — PROGRESS NOTES
OB FOLLOW UP  CC- Here for care of pregnancy        Kathy Anthony is a 29 y.o.  24w0d patient being seen today for her obstetrical follow up visit. Patient reports binh carrillo ctx especially after working, and pubic bone pain.      Her prenatal care is complicated by (and status) :   Patient Active Problem List   Diagnosis    History of postpartum hemorrhage       Flu Status: Already given in current flu season  Ultrasound Today: No  Reviewed 1 hr glucose testing and TDAP next visit.    ROS -   Patient Denies: leaking of fluid, vaginal bleeding, dysuria, excessive vomiting, and more than 6 contractions per hour  Fetal Movement : normal  All other systems reviewed and are negative.       The additional following portions of the patient's history were reviewed and updated as appropriate: allergies, current medications, past family history, past medical history, past social history, past surgical history, and problem list.      I have reviewed and agree with the HPI, ROS, and historical information as entered above. MAYLIN Ring      /70   Wt 97.9 kg (215 lb 12.8 oz)   LMP 2024   BMI 39.47 kg/m²       EXAM:     Prenatal Vitals  BP: 110/70  Weight: 97.9 kg (215 lb 12.8 oz)   Fetal Heart Rate: 160               Urine Glucose Read-only: Negative  Urine Protein Read-only: Negative       Assessment and Plan    Problem List Items Addressed This Visit    None  Visit Diagnoses       Prenatal care, antepartum    -  Primary    Relevant Orders    POC Urinalysis Dipstick (Completed)            Pregnancy at 24w0d  Fetal status reassuring.  No US indicated today.  1 hour gtt, CBC, Antibody screen, TDAP, and RPR next visit. Instructions given  Fetal movement/PTL or Labor precautions  Discussed/encouraged TDAP vaccination after 28 weeks  Reviewed Pre-eclampsia signs/symptoms  Activity and Exercise discussed.  Return for Next scheduled follow up, MAYLIN Gann  2025

## 2025-02-06 ENCOUNTER — TELEPHONE (OUTPATIENT)
Dept: OBSTETRICS AND GYNECOLOGY | Facility: CLINIC | Age: 30
End: 2025-02-06

## 2025-02-06 ENCOUNTER — ROUTINE PRENATAL (OUTPATIENT)
Dept: OBSTETRICS AND GYNECOLOGY | Facility: CLINIC | Age: 30
End: 2025-02-06
Payer: COMMERCIAL

## 2025-02-06 VITALS — SYSTOLIC BLOOD PRESSURE: 126 MMHG | BODY MASS INDEX: 40.06 KG/M2 | DIASTOLIC BLOOD PRESSURE: 80 MMHG | WEIGHT: 219 LBS

## 2025-02-06 DIAGNOSIS — Z34.93 PRENATAL CARE IN THIRD TRIMESTER: Primary | ICD-10-CM

## 2025-02-06 DIAGNOSIS — Z87.59 HISTORY OF POSTPARTUM HEMORRHAGE: ICD-10-CM

## 2025-02-06 DIAGNOSIS — E66.01 MORBID OBESITY WITH BMI OF 40.0-44.9, ADULT: ICD-10-CM

## 2025-02-06 LAB
GLUCOSE UR STRIP-MCNC: NEGATIVE MG/DL
PROT UR STRIP-MCNC: NEGATIVE MG/DL

## 2025-02-06 NOTE — TELEPHONE ENCOUNTER
Called patient reviewed with her reason for pdc scan is for morbid obesity-check growth of fetus. VU.

## 2025-02-06 NOTE — PROGRESS NOTES
OB FOLLOW UP  CC- Here for care of pregnancy        Kathy Anthony is a 29 y.o.  28w0d patient being seen today for her obstetrical follow up. Patient reports sinus congestion that she is taking Mucinex for and it is helping. Patient reports occasional headaches without vision changes that are relieved with Tylenol. Patient also reports occasional heartburn & BH ctx    Patient undergoing Glucola testing today. She is due for her testing at 11:15 AM.       MBT: A+  Rhogam: is not indicated.  28 week packet: reviewed with patient , counseled on fetal movement , pediatrician list reviewed, breast pump discussed, and childbirth classes reviewed  TDAP: given today  Flu Status: Already given in current flu season  Ultrasound Today: No    Her prenatal care is complicated by (and status) : see below.  Patient Active Problem List   Diagnosis    History of postpartum hemorrhage         ROS -   Patient Denies: Loss of Fluid, Vaginal Spotting, Vision Changes, Nausea , Vomiting , Contractions, Epigastric pain, and skin itching  Fetal Movement : normal  Other than what is documented in the HPI, all other systems reviewed and are negative.     The additional following portions of the patient's history were reviewed and updated as appropriate: allergies and current medications.    I have reviewed and agree with the HPI, ROS, and historical information as entered above. Claudia Guallpa MD      /80   Wt 99.3 kg (219 lb)   LMP 2024   BMI 40.06 kg/m²         EXAM:     Prenatal Vitals  BP: 126/80  Weight: 99.3 kg (219 lb)   Fetal Heart Rate: pos               Urine Glucose Read-only: Negative  Urine Protein Read-only: Negative         Assessment and Plan    Problem List Items Addressed This Visit          Gravid and     History of postpartum hemorrhage     Other Visit Diagnoses       Prenatal care in third trimester    -  Primary    Relevant Orders    POC Urinalysis Dipstick (Completed)    CBC (No  Diff)    Gestational Screen 1 Hr ([facility])    Antibody Screen    RPR, Rfx Qn RPR / Confirm TP    Tdap Vaccine Greater Than or Equal To 8yo IM (Completed)    Morbid obesity with BMI of 40.0-44.9, adult        Relevant Orders    [facility]            Pregnancy at 28w0d  1 hr Glucola, CBC, RPR. Antibody screen  Fetal movement/PTL or Labor precautions  Activity and Exercise discussed.  PDC scan at 32 weeks BMI > 40  Return in about 4 weeks (around 3/6/2025), or with PDC scan.        Claudia Guallpa MD  2025

## 2025-02-06 NOTE — TELEPHONE ENCOUNTER
PT CALLED AND WOULD LIKE TO KNOW WHY SHE IS GOING TO PDC, PT STATES IT WAS NOT TALKED ABOUT IN HER APPT TODAY

## 2025-02-07 LAB
BLD GP AB SCN SERPL QL: NEGATIVE
ERYTHROCYTE [DISTWIDTH] IN BLOOD BY AUTOMATED COUNT: 13.2 % (ref 12.3–15.4)
GLUCOSE 1H P 50 G GLC PO SERPL-MCNC: 194 MG/DL (ref 65–139)
HCT VFR BLD AUTO: 35.3 % (ref 34–46.6)
HGB BLD-MCNC: 12.2 G/DL (ref 12–15.9)
MCH RBC QN AUTO: 31.6 PG (ref 26.6–33)
MCHC RBC AUTO-ENTMCNC: 34.6 G/DL (ref 31.5–35.7)
MCV RBC AUTO: 91.5 FL (ref 79–97)
PLATELET # BLD AUTO: 306 10*3/MM3 (ref 140–450)
RBC # BLD AUTO: 3.86 10*6/MM3 (ref 3.77–5.28)
RPR SER QL: NON REACTIVE
WBC # BLD AUTO: 10.16 10*3/MM3 (ref 3.4–10.8)

## 2025-02-10 ENCOUNTER — LAB (OUTPATIENT)
Dept: OBSTETRICS AND GYNECOLOGY | Facility: CLINIC | Age: 30
End: 2025-02-10
Payer: COMMERCIAL

## 2025-02-10 DIAGNOSIS — Z34.93 PRENATAL CARE IN THIRD TRIMESTER: Primary | ICD-10-CM

## 2025-02-11 LAB
GLUCOSE 1H P 100 G GLC PO SERPL-MCNC: 203 MG/DL (ref 65–179)
GLUCOSE 2H P 100 G GLC PO SERPL-MCNC: 140 MG/DL (ref 65–154)
GLUCOSE 3H P 100 G GLC PO SERPL-MCNC: 85 MG/DL (ref 65–139)
GLUCOSE P FAST SERPL-MCNC: 79 MG/DL (ref 65–94)

## 2025-03-06 ENCOUNTER — HOSPITAL ENCOUNTER (OUTPATIENT)
Dept: WOMENS IMAGING | Facility: HOSPITAL | Age: 30
Discharge: HOME | End: 2025-03-06
Admitting: OBSTETRICS & GYNECOLOGY
Payer: COMMERCIAL

## 2025-03-06 ENCOUNTER — OFFICE VISIT (OUTPATIENT)
Dept: OBSTETRICS AND GYNECOLOGY | Facility: HOSPITAL | Age: 30
End: 2025-03-06
Payer: COMMERCIAL

## 2025-03-06 ENCOUNTER — ROUTINE PRENATAL (OUTPATIENT)
Dept: OBSTETRICS AND GYNECOLOGY | Facility: CLINIC | Age: 30
End: 2025-03-06
Payer: COMMERCIAL

## 2025-03-06 VITALS — SYSTOLIC BLOOD PRESSURE: 112 MMHG | WEIGHT: 225 LBS | DIASTOLIC BLOOD PRESSURE: 62 MMHG | BODY MASS INDEX: 41.15 KG/M2

## 2025-03-06 VITALS — DIASTOLIC BLOOD PRESSURE: 82 MMHG | SYSTOLIC BLOOD PRESSURE: 120 MMHG | WEIGHT: 225 LBS | BODY MASS INDEX: 41.15 KG/M2

## 2025-03-06 DIAGNOSIS — E66.01 MORBID OBESITY WITH BMI OF 40.0-44.9, ADULT: ICD-10-CM

## 2025-03-06 DIAGNOSIS — Z3A.32 32 WEEKS GESTATION OF PREGNANCY: ICD-10-CM

## 2025-03-06 DIAGNOSIS — R10.13 EPIGASTRIC PAIN: ICD-10-CM

## 2025-03-06 DIAGNOSIS — Z87.59 HISTORY OF POSTPARTUM HEMORRHAGE: Primary | ICD-10-CM

## 2025-03-06 DIAGNOSIS — Z34.93 PRENATAL CARE IN THIRD TRIMESTER: Primary | ICD-10-CM

## 2025-03-06 LAB
GLUCOSE UR STRIP-MCNC: ABNORMAL MG/DL
PROT UR STRIP-MCNC: NEGATIVE MG/DL

## 2025-03-06 PROCEDURE — 76811 OB US DETAILED SNGL FETUS: CPT

## 2025-03-06 PROCEDURE — 76819 FETAL BIOPHYS PROFIL W/O NST: CPT

## 2025-03-06 RX ORDER — GUAIFENESIN 600 MG/1
1200 TABLET, EXTENDED RELEASE ORAL 2 TIMES DAILY
COMMUNITY

## 2025-03-06 NOTE — PROGRESS NOTES
Patient seen in Maternal Fetal Medicine clinic today. Please see full note in under imaging tab of patient chart in Epic (Viewpoint report).    Ileana Salgado MD

## 2025-03-06 NOTE — PROGRESS NOTES
OB FOLLOW UP  CC- Here for care of pregnancy        Kathy Anthony is a 29 y.o.  32w0d patient being seen today for her obstetrical follow up visit. Patient reports frequent epigastric pain and occasional BH ctx.      Her prenatal care is complicated by (and status) :  see below.  Patient Active Problem List   Diagnosis    History of postpartum hemorrhage    Morbid obesity with BMI of 40.0-44.9, adult       Flu Status: Already given in current flu season  TDAP status: received at last visit  Rhogam status: was not indicated  28 week labs: Reviewed and Failed 1hr gtt, passed 3hr.  Ultrasound Today: Yes, at PDC. Breech. EFW 3 lb 15 oz. BPP 8/  Non Stress Test: No.      ROS -   Patient Denies: Loss of Fluid, Vaginal Spotting, Vision Changes, Headaches, Nausea , Vomiting , Contractions, and skin itching  Fetal Movement : normal  Other than what is documented in the HPI, all other systems reviewed and are negative.     The additional following portions of the patient's history were reviewed and updated as appropriate: allergies and current medications.    I have reviewed and agree with the HPI, ROS, and historical information as entered above. Claudia Guallpa MD      /82   Wt 102 kg (225 lb)   LMP 2024   BMI 41.15 kg/m²         EXAM:     Prenatal Vitals  BP: 120/82  Weight: 102 kg (225 lb)   Fetal Heart Rate: 156               Urine Glucose Read-only: (!) 1+  Urine Protein Read-only: Negative           Assessment and Plan    Problem List Items Addressed This Visit    None  Visit Diagnoses       Prenatal care in third trimester    -  Primary    Relevant Orders    POC Urinalysis Dipstick (Completed)    AlP+ALT+AST+Creat+LD+TBili+..    Epigastric pain        Relevant Orders    AlP+ALT+AST+Creat+LD+TBili+..    Maternal care for breech presentation, single gestation                Pregnancy at 32w0d  Fetal status reassuring.  PDC scan reviewed.  28 week labs reviewed.    Activity and Exercise  discussed.  Fetal movement/PTL or Labor precautions  Return in about 2 weeks (around 3/20/2025).    Claudia Guallpa MD  03/06/2025

## 2025-03-06 NOTE — PROGRESS NOTES
Patient denies any leaking fluid or vaginal bleeding. States having irregular binh carrillo contractions.   NIPT negative.  Patient reports next follow-up appointment with Dr. Guallpa' office is today.

## 2025-03-07 LAB
ALP SERPL-CCNC: 111 U/L (ref 39–117)
ALT SERPL-CCNC: 13 U/L (ref 1–33)
AST SERPL-CCNC: 31 U/L (ref 1–32)
BASOPHILS # BLD AUTO: 0.05 10*3/MM3 (ref 0–0.2)
BASOPHILS NFR BLD AUTO: 0.5 % (ref 0–1.5)
BILIRUB SERPL-MCNC: <0.2 MG/DL (ref 0–1.2)
CREAT SERPL-MCNC: 0.57 MG/DL (ref 0.57–1)
EGFRCR SERPLBLD CKD-EPI 2021: 124.8 ML/MIN/1.73
EOSINOPHIL # BLD AUTO: 0.21 10*3/MM3 (ref 0–0.4)
EOSINOPHIL NFR BLD AUTO: 2.1 % (ref 0.3–6.2)
ERYTHROCYTE [DISTWIDTH] IN BLOOD BY AUTOMATED COUNT: 14 % (ref 12.3–15.4)
HCT VFR BLD AUTO: 38.9 % (ref 34–46.6)
HGB BLD-MCNC: 12.7 G/DL (ref 12–15.9)
IMM GRANULOCYTES # BLD AUTO: 0.11 10*3/MM3 (ref 0–0.05)
IMM GRANULOCYTES NFR BLD AUTO: 1.1 % (ref 0–0.5)
LDH SERPL L TO P-CCNC: 205 U/L (ref 135–214)
LYMPHOCYTES # BLD AUTO: 1.76 10*3/MM3 (ref 0.7–3.1)
LYMPHOCYTES NFR BLD AUTO: 17.2 % (ref 19.6–45.3)
MCH RBC QN AUTO: 31.1 PG (ref 26.6–33)
MCHC RBC AUTO-ENTMCNC: 32.6 G/DL (ref 31.5–35.7)
MCV RBC AUTO: 95.1 FL (ref 79–97)
MONOCYTES # BLD AUTO: 0.8 10*3/MM3 (ref 0.1–0.9)
MONOCYTES NFR BLD AUTO: 7.8 % (ref 5–12)
NEUTROPHILS # BLD AUTO: 7.3 10*3/MM3 (ref 1.7–7)
NEUTROPHILS NFR BLD AUTO: 71.3 % (ref 42.7–76)
NRBC BLD AUTO-RTO: 0 /100 WBC (ref 0–0.2)
PLATELET # BLD AUTO: 286 10*3/MM3 (ref 140–450)
RBC # BLD AUTO: 4.09 10*6/MM3 (ref 3.77–5.28)
URATE SERPL-MCNC: 5.3 MG/DL (ref 2.4–5.7)
WBC # BLD AUTO: 10.23 10*3/MM3 (ref 3.4–10.8)

## 2025-03-10 ENCOUNTER — TELEPHONE (OUTPATIENT)
Dept: OBSTETRICS AND GYNECOLOGY | Facility: CLINIC | Age: 30
End: 2025-03-10
Payer: COMMERCIAL

## 2025-03-10 NOTE — TELEPHONE ENCOUNTER
Patient of Dr. Guallpa;  @ 32w 4d. LOV 25.   Returned patient's call.   States she has had a cough for past 3 weeks; she was never tested for COVID or flu.  States Dr. Guallpa said she would send in Rx for a steroid pack but another office was using patient's chart at the time so Dr. Guallpa was not able to access it; Dr. Guallpa may have forgotten.  Asking if she can get that RX sent in.  Informed her that Dr. Guallpa is not in the office today; I will check with her tomorrow and call patient back. Patient v/u and agreed.

## 2025-03-11 RX ORDER — METHYLPREDNISOLONE 4 MG/1
TABLET ORAL
Qty: 21 TABLET | Refills: 0 | Status: SHIPPED | OUTPATIENT
Start: 2025-03-11

## 2025-03-11 NOTE — TELEPHONE ENCOUNTER
Spoke with Dr. Guallpa; she sent in Rx for Medrol dose pack.   Informed patient that RX has been sent to her pharmacy; she v/u.

## 2025-03-18 ENCOUNTER — HOSPITAL ENCOUNTER (OUTPATIENT)
Facility: HOSPITAL | Age: 30
Discharge: HOME | End: 2025-03-18
Attending: OBSTETRICS & GYNECOLOGY | Admitting: OBSTETRICS & GYNECOLOGY
Payer: COMMERCIAL

## 2025-03-18 VITALS — RESPIRATION RATE: 16 BRPM | DIASTOLIC BLOOD PRESSURE: 74 MMHG | TEMPERATURE: 98.4 F | SYSTOLIC BLOOD PRESSURE: 128 MMHG

## 2025-03-18 PROBLEM — O26.893: Status: ACTIVE | Noted: 2025-03-18

## 2025-03-18 PROBLEM — R10.12: Status: ACTIVE | Noted: 2025-03-18

## 2025-03-18 LAB
ALBUMIN SERPL-MCNC: 3.3 G/DL (ref 3.5–5.2)
ALBUMIN/GLOB SERPL: 1.1 G/DL
ALP SERPL-CCNC: 118 U/L (ref 39–117)
ALT SERPL W P-5'-P-CCNC: 11 U/L (ref 1–33)
ANION GAP SERPL CALCULATED.3IONS-SCNC: 16 MMOL/L (ref 5–15)
AST SERPL-CCNC: 28 U/L (ref 1–32)
BACTERIA UR QL AUTO: ABNORMAL /HPF
BACTERIA UR QL AUTO: ABNORMAL /HPF
BILIRUB SERPL-MCNC: <0.2 MG/DL (ref 0–1.2)
BILIRUB UR QL STRIP: NEGATIVE
BILIRUB UR QL STRIP: NEGATIVE
BUN SERPL-MCNC: 7 MG/DL (ref 6–20)
BUN/CREAT SERPL: 13.7 (ref 7–25)
CALCIUM SPEC-SCNC: 8.8 MG/DL (ref 8.6–10.5)
CHLORIDE SERPL-SCNC: 102 MMOL/L (ref 98–107)
CLARITY UR: ABNORMAL
CLARITY UR: CLEAR
CO2 SERPL-SCNC: 19 MMOL/L (ref 22–29)
COD CRY URNS QL: ABNORMAL /HPF
COLOR UR: YELLOW
COLOR UR: YELLOW
CREAT SERPL-MCNC: 0.51 MG/DL (ref 0.57–1)
DEPRECATED RDW RBC AUTO: 46.2 FL (ref 37–54)
EGFRCR SERPLBLD CKD-EPI 2021: 128.2 ML/MIN/1.73
ERYTHROCYTE [DISTWIDTH] IN BLOOD BY AUTOMATED COUNT: 14.5 % (ref 12.3–15.4)
GLOBULIN UR ELPH-MCNC: 2.9 GM/DL
GLUCOSE SERPL-MCNC: 88 MG/DL (ref 65–99)
GLUCOSE UR STRIP-MCNC: NEGATIVE MG/DL
GLUCOSE UR STRIP-MCNC: NEGATIVE MG/DL
HCT VFR BLD AUTO: 35.4 % (ref 34–46.6)
HGB BLD-MCNC: 12.2 G/DL (ref 12–15.9)
HGB UR QL STRIP.AUTO: NEGATIVE
HGB UR QL STRIP.AUTO: NEGATIVE
HYALINE CASTS UR QL AUTO: ABNORMAL /LPF
HYALINE CASTS UR QL AUTO: ABNORMAL /LPF
KETONES UR QL STRIP: ABNORMAL
KETONES UR QL STRIP: ABNORMAL
LEUKOCYTE ESTERASE UR QL STRIP.AUTO: ABNORMAL
LEUKOCYTE ESTERASE UR QL STRIP.AUTO: ABNORMAL
MCH RBC QN AUTO: 30.5 PG (ref 26.6–33)
MCHC RBC AUTO-ENTMCNC: 34.5 G/DL (ref 31.5–35.7)
MCV RBC AUTO: 88.5 FL (ref 79–97)
NITRITE UR QL STRIP: NEGATIVE
NITRITE UR QL STRIP: NEGATIVE
PH UR STRIP.AUTO: 5.5 [PH] (ref 5–8)
PH UR STRIP.AUTO: 6 [PH] (ref 5–8)
PLATELET # BLD AUTO: 221 10*3/MM3 (ref 140–450)
PMV BLD AUTO: 10.2 FL (ref 6–12)
POTASSIUM SERPL-SCNC: 3.6 MMOL/L (ref 3.5–5.2)
PROT SERPL-MCNC: 6.2 G/DL (ref 6–8.5)
PROT UR QL STRIP: NEGATIVE
PROT UR QL STRIP: NEGATIVE
RBC # BLD AUTO: 4 10*6/MM3 (ref 3.77–5.28)
RBC # UR STRIP: ABNORMAL /HPF
RBC # UR STRIP: ABNORMAL /HPF
REF LAB TEST METHOD: ABNORMAL
REF LAB TEST METHOD: ABNORMAL
SODIUM SERPL-SCNC: 137 MMOL/L (ref 136–145)
SP GR UR STRIP: 1.02 (ref 1–1.03)
SP GR UR STRIP: 1.02 (ref 1–1.03)
SQUAMOUS #/AREA URNS HPF: ABNORMAL /HPF
SQUAMOUS #/AREA URNS HPF: ABNORMAL /HPF
UROBILINOGEN UR QL STRIP: ABNORMAL
UROBILINOGEN UR QL STRIP: ABNORMAL
WBC # UR STRIP: ABNORMAL /HPF
WBC # UR STRIP: ABNORMAL /HPF
WBC NRBC COR # BLD AUTO: 9.96 10*3/MM3 (ref 3.4–10.8)

## 2025-03-18 PROCEDURE — 80053 COMPREHEN METABOLIC PANEL: CPT | Performed by: OBSTETRICS & GYNECOLOGY

## 2025-03-18 PROCEDURE — G0463 HOSPITAL OUTPT CLINIC VISIT: HCPCS

## 2025-03-18 PROCEDURE — 81001 URINALYSIS AUTO W/SCOPE: CPT | Performed by: OBSTETRICS & GYNECOLOGY

## 2025-03-18 PROCEDURE — 36415 COLL VENOUS BLD VENIPUNCTURE: CPT | Performed by: OBSTETRICS & GYNECOLOGY

## 2025-03-18 PROCEDURE — 59025 FETAL NON-STRESS TEST: CPT

## 2025-03-18 PROCEDURE — 85027 COMPLETE CBC AUTOMATED: CPT | Performed by: OBSTETRICS & GYNECOLOGY

## 2025-03-19 NOTE — H&P
"Kathy Anthony  1995  [id number]  [id number]    CC: contractions, LUQ pain  HPI:  Patient is 29 y.o. female   currently at 33w5d presents with c/o occas contractions (~q30\").  Pt denies bleeding or leaking.  Good FM.  PT also c/o pain in LUQ, onset Saturday.  Pt describes the pain as a \"burning.\"  Pain is intermittent, lasts for hours at a time before resolving.  Pt has some assoc N, but denies V, or D.  BPNC to date    PMH:  Current meds: PNV, pepcid/d, Tyl prn, just finished prednisone for cough  Illnesses: IBS-D, anxiety/depression, GERD  Surgeries: oral surg  Allergies: NKDA    Past OB History:       OB History    Para Term  AB Living   4 3 3 0 0 3   SAB IAB Ectopic Molar Multiple Live Births   0 0 0 0 0 3      # Outcome Date GA Lbr Oscar/2nd Weight Sex Type Anes PTL Lv   4 Current            3 Term 10/19/17 39w4d 06:46 / 00:08 3145 g (6 lb 14.9 oz) F Vaginal unsp EPI N JAY      Name: MAGGI,BABY GIRL       Apgar1: 9  Apgar5: 9   2 Term 16 39w1d 03:14 / 00:16 3371 g (7 lb 6.9 oz) F Vag-Spont EPI Y JAY      Name: Rylee      Apgar1: 8  Apgar5: 9   1 Term 11 42w0d 16:00 / 00:45 3345 g (7 lb 6 oz) F Vag-Spont EPI N JAY      Complications: Other Excessive Bleeding, Postpartum Hemorrhage      Name: zachariah            SH: tob neg , EtOH neg, drugs neg      General ROS: contractions, LUQ pain.   All other systems reviewed and are negative.      Physical Examination: General appearance - alert, well appearing, and in no distress  Vital signs - /74   Resp 16   LMP 2024   HEENT: normocephalic, atraumatic,oropharynx clear, appearance of ears and nose normal  Neck - supple, no significant adenopathy, no thyromegaly  Lymphatics - no palpable lymphadenopathy in the neck or groin, no hepatosplenomegaly  Chest - clear to auscultation, no wheezes, rales or rhonchi, respiratory effort non-labored  Heart - normal rate, regular rhythm, no murmurs, rubs, clicks or gallops, no JVD, no " lower extremity edema  Abdomen - soft, no apprec tenderness, no tend in LUQ, nondistended, no masses, no hepatosplenomegaly, no rebound tenderness noted, bowel sounds normal  Extremities - no pedal edema noted, no calf tenderness  Skin -warm and dry, normal coloration and turgor, no rashes, no suspicious skin lesions noted      Labs:  Results from last 7 days   Lab Units 25  2041   WBC 10*3/mm3 9.96   HEMOGLOBIN g/dL 12.2   HEMATOCRIT % 35.4   PLATELETS 10*3/mm3 221     Results from last 7 days   Lab Units 25  2041   SODIUM mmol/L 137   POTASSIUM mmol/L 3.6   CHLORIDE mmol/L 102   CO2 mmol/L 19.0*   BUN mg/dL 7   CREATININE mg/dL 0.51*   CALCIUM mg/dL 8.8   BILIRUBIN mg/dL <0.2   ALK PHOS U/L 118*   ALT (SGPT) U/L 11   AST (SGOT) U/L 28   GLUCOSE mg/dL 88     Fetal monitoring: indication LUQ pain, onset , offset , baseline 140-150, mod BTB variability, multiple accels (15 X 15), no decels, occas contractions, interpretation reactive NST    Radiology     Assessment 1)IUP 33 5/7 weeks   2) contractions   3)LUQ pain- unsure etiology, exam unrevealing, labs normal (ccUA pend)    Plan 1)observe   2)labs   3)home   4)return for worse pain, new onset V, others    Joe Gipson MD  3/18/2025  21:20 EDT

## 2025-03-20 ENCOUNTER — ROUTINE PRENATAL (OUTPATIENT)
Dept: OBSTETRICS AND GYNECOLOGY | Facility: CLINIC | Age: 30
End: 2025-03-20
Payer: COMMERCIAL

## 2025-03-20 VITALS — WEIGHT: 225 LBS | DIASTOLIC BLOOD PRESSURE: 82 MMHG | SYSTOLIC BLOOD PRESSURE: 122 MMHG | BODY MASS INDEX: 41.15 KG/M2

## 2025-03-20 DIAGNOSIS — Z87.59 HISTORY OF POSTPARTUM HEMORRHAGE: ICD-10-CM

## 2025-03-20 DIAGNOSIS — O26.893: ICD-10-CM

## 2025-03-20 DIAGNOSIS — Z34.93 PRENATAL CARE IN THIRD TRIMESTER: Primary | ICD-10-CM

## 2025-03-20 DIAGNOSIS — R10.12: ICD-10-CM

## 2025-03-20 DIAGNOSIS — E66.01 MORBID OBESITY WITH BMI OF 40.0-44.9, ADULT: ICD-10-CM

## 2025-03-20 LAB
GLUCOSE UR STRIP-MCNC: NEGATIVE MG/DL
PROT UR STRIP-MCNC: NEGATIVE MG/DL

## 2025-03-20 PROCEDURE — 0502F SUBSEQUENT PRENATAL CARE: CPT | Performed by: OBSTETRICS & GYNECOLOGY

## 2025-03-20 NOTE — PROGRESS NOTES
OB FOLLOW UP  CC- Here for care of pregnancy        Kathy Anthony is a 29 y.o.  34w0d patient being seen today for her obstetrical follow up visit. Patient reports contractions every 15-20 minutes today.    Patient seen in Triage on 2025 for occasional contractions every 30 minutes and LUQ pain. Fetal status reassuring. Labs, urine, and physical exam was unrevealing. Patient d/c'd home. Patient states that her cervix was not checked in Triage.     Her prenatal care is complicated by (and status) : see below.  Patient Active Problem List   Diagnosis    History of postpartum hemorrhage    Morbid obesity with BMI of 40.0-44.9, adult    Left upper quadrant abdominal pain affecting pregnancy in third trimester       Flu Status: Already given in current flu season  Ultrasound Today: No  Non Stress Test: No    ROS -   Patient Denies: Loss of Fluid, Vaginal Spotting, Vision Changes, Headaches, Nausea , Vomiting , Epigastric pain, and skin itching  Fetal Movement : normal  Other than what is documented in the HPI, all other systems reviewed and are negative.       The additional following portions of the patient's history were reviewed and updated as appropriate: allergies, current medications, and problem list.    I have reviewed and agree with the HPI, ROS, and historical information as entered above. Claudia Guallpa MD      /82   Wt 102 kg (225 lb)   LMP 2024   BMI 41.15 kg/m²       EXAM:     Prenatal Vitals  BP: 122/82  Weight: 102 kg (225 lb)   Fetal Heart Rate: pos   Dilation/Effacement/Station  Dilation: 3  Effacement (%): 50           Urine Glucose Read-only: Negative  Urine Protein Read-only: Negative           Assessment and Plan    Problem List Items Addressed This Visit          Endocrine and Metabolic    Morbid obesity with BMI of 40.0-44.9, adult       Gastrointestinal Abdominal     Left upper quadrant abdominal pain affecting pregnancy in third trimester       Gravid and      History of postpartum hemorrhage     Other Visit Diagnoses         Prenatal care in third trimester    -  Primary    Relevant Orders    POC Urinalysis Dipstick (Completed)            Pregnancy at 34w0d  Cervix closed.  Avoid constipation  Fetal status reassuring.   Activity and Exercise discussed.  Fetal movement/PTL or Labor precautions  GBS next visit  Return in about 2 weeks (around 4/3/2025).    Claudia Guallpa MD  2025

## 2025-03-26 ENCOUNTER — TELEPHONE (OUTPATIENT)
Dept: OBSTETRICS AND GYNECOLOGY | Facility: CLINIC | Age: 30
End: 2025-03-26
Payer: COMMERCIAL

## 2025-03-26 NOTE — TELEPHONE ENCOUNTER
Provider: DR TEJADA    Caller: Kathy Anthony    Relationship to Patient: Self    Phone Number: [phone]    Reason for Call: OB PT - 34W6D- THINKS SHE HAS THE FLU; PT HAS APPT TO GET SWABBED TODAY; PT STATED THAT WHEN SHE WAS HERE LAST WE TOLD HER IF SHE WAS TO GET THE FLU OR COVID  WHILE PREGNANT THEN THERE IS SPECIAL INSTRUCTIONS SHE WOULD NEED TO FOLLOW; PT IS WANTING TO KNOW WHAT THOSE SPECIAL INSTRUCTIONS ARE?    PLEASE CALL PT TO ADVISE.

## 2025-03-26 NOTE — TELEPHONE ENCOUNTER
Pt tested positive for flu A and has been prescribed tamiflu. Reviewed over the counter cough and cold medications to help with symptoms. Advised lots of rest and increased fluids. Pt VU

## 2025-03-29 ENCOUNTER — HOSPITAL ENCOUNTER (EMERGENCY)
Facility: HOSPITAL | Age: 30
Discharge: HOME | End: 2025-03-29
Attending: EMERGENCY MEDICINE
Payer: COMMERCIAL

## 2025-03-29 ENCOUNTER — APPOINTMENT (OUTPATIENT)
Facility: HOSPITAL | Age: 30
End: 2025-03-29
Payer: COMMERCIAL

## 2025-03-29 VITALS
OXYGEN SATURATION: 96 % | HEIGHT: 62 IN | SYSTOLIC BLOOD PRESSURE: 120 MMHG | DIASTOLIC BLOOD PRESSURE: 88 MMHG | WEIGHT: 225 LBS | HEART RATE: 75 BPM | BODY MASS INDEX: 41.41 KG/M2 | RESPIRATION RATE: 16 BRPM | TEMPERATURE: 98 F

## 2025-03-29 DIAGNOSIS — R07.81 RIB PAIN ON RIGHT SIDE: Primary | ICD-10-CM

## 2025-03-29 PROCEDURE — 96374 THER/PROPH/DIAG INJ IV PUSH: CPT

## 2025-03-29 PROCEDURE — 99283 EMERGENCY DEPT VISIT LOW MDM: CPT

## 2025-03-29 PROCEDURE — 96375 TX/PRO/DX INJ NEW DRUG ADDON: CPT

## 2025-03-29 PROCEDURE — 25010000002 DEXAMETHASONE PER 1 MG: Performed by: PHYSICIAN ASSISTANT

## 2025-03-29 PROCEDURE — 71101 X-RAY EXAM UNILAT RIBS/CHEST: CPT

## 2025-03-29 PROCEDURE — 25010000002 MORPHINE PER 10 MG: Performed by: EMERGENCY MEDICINE

## 2025-03-29 RX ORDER — LIDOCAINE 50 MG/G
1 PATCH TOPICAL EVERY 24 HOURS
Qty: 10 PATCH | Refills: 0 | Status: SHIPPED | OUTPATIENT
Start: 2025-03-29 | End: 2025-03-29

## 2025-03-29 RX ORDER — LIDOCAINE 4 G/G
1 PATCH TOPICAL ONCE
Status: DISCONTINUED | OUTPATIENT
Start: 2025-03-29 | End: 2025-03-29 | Stop reason: HOSPADM

## 2025-03-29 RX ORDER — SODIUM CHLORIDE 0.9 % (FLUSH) 0.9 %
10 SYRINGE (ML) INJECTION AS NEEDED
Status: DISCONTINUED | OUTPATIENT
Start: 2025-03-29 | End: 2025-03-29 | Stop reason: HOSPADM

## 2025-03-29 RX ORDER — DEXAMETHASONE SODIUM PHOSPHATE 10 MG/ML
10 INJECTION, SOLUTION INTRA-ARTICULAR; INTRALESIONAL; INTRAMUSCULAR; INTRAVENOUS; SOFT TISSUE ONCE
Status: COMPLETED | OUTPATIENT
Start: 2025-03-29 | End: 2025-03-29

## 2025-03-29 RX ORDER — LIDOCAINE 50 MG/G
1 PATCH TOPICAL EVERY 24 HOURS
Qty: 10 PATCH | Refills: 0 | Status: SHIPPED | OUTPATIENT
Start: 2025-03-29

## 2025-03-29 RX ADMIN — MORPHINE SULFATE 4 MG: 4 INJECTION, SOLUTION INTRAMUSCULAR; INTRAVENOUS at 10:41

## 2025-03-29 RX ADMIN — DEXAMETHASONE SODIUM PHOSPHATE 10 MG: 10 INJECTION INTRAMUSCULAR; INTRAVENOUS at 11:35

## 2025-03-29 RX ADMIN — LIDOCAINE 1 PATCH: 4 PATCH TOPICAL at 10:43

## 2025-03-29 NOTE — FSED PROVIDER NOTE
Subjective  History of Present Illness:    This is a 29 year old female who is 35 weeks pregnant (OB is Dr. Hester) who presents with complaints of right posterior rib pain that started last night. Patient states that she was diagnosed with influenza A 3 days ago and has been on Tamiflu and Zithromax since then.  Patient states that she was coughing last night and felt a pop in her posterior ribs and has since had significant pain.  She is having pain with deep inspiration.  She has had fevers.  Denies any shortness of breath but states that is difficult to take a deep breath secondary to pain.  No history of rib fractures.  She has taken Tylenol without any relief of symptoms along with heat.      Nurses Notes reviewed and agree, including vitals, allergies, social history and prior medical history.     REVIEW OF SYSTEMS: All systems reviewed and not pertinent unless noted.  Review of Systems    Past Medical History:   Diagnosis Date    Anxiety 2006    Depression     GERD (gastroesophageal reflux disease)     History of postpartum hemorrhage 2011    IBS (irritable bowel syndrome)        Allergies:    Patient has no known allergies.      Past Surgical History:   Procedure Laterality Date    MOUTH SURGERY  2024    ROOT CANAL      x3    WISDOM TOOTH EXTRACTION  2019         Social History     Socioeconomic History    Marital status:    Tobacco Use    Smoking status: Never    Smokeless tobacco: Never   Vaping Use    Vaping status: Never Used   Substance and Sexual Activity    Alcohol use: Not Currently     Comment: rarely    Drug use: Never    Sexual activity: Yes     Partners: Male         Family History   Problem Relation Age of Onset    Stroke Father     Hypertension Father     Heart disease Father     Hypertension Mother      labor Mother     Stroke Maternal Grandmother     Diabetes Maternal Grandmother     Stroke Maternal Grandfather        Objective  Physical Exam:  /83   " Pulse 95   Temp 98 °F (36.7 °C)   Resp 16   Ht 157.5 cm (62\")   Wt 102 kg (225 lb)   LMP 07/25/2024   SpO2 98%   BMI 41.15 kg/m²      Physical Exam  Vitals and nursing note reviewed.   Constitutional:       Appearance: Normal appearance.      Comments: Patient in obvious pain, tearful, unable to get comfortable.   Cardiovascular:      Rate and Rhythm: Normal rate and regular rhythm.   Pulmonary:      Effort: Pulmonary effort is normal.      Breath sounds: Normal breath sounds.   Abdominal:      General: Abdomen is flat.   Musculoskeletal:      Comments: Right posterior rib pain, no ecchymosis, she does have tenderness to palpation, no crepitus   Neurological:      General: No focal deficit present.      Mental Status: She is alert.   Psychiatric:         Mood and Affect: Mood normal.         Behavior: Behavior normal.         Procedures    ED Course:         Lab Results (last 24 hours)       ** No results found for the last 24 hours. **             XR Ribs Right With PA Chest  Result Date: 3/29/2025  XR RIBS RIGHT W PA CHEST Date of Exam: 3/29/2025 10:43 AM EDT Indication: right rib pain Comparison: None available. Findings: Cardiomediastinal silhouette is unremarkable.  No airspace disease, pneumothorax, nor pleural effusion. No acute osseous abnormality identified.     Impression: Impression: No acute findings. Electronically Signed: Kevin Perry MD  3/29/2025 11:09 AM EDT  Workstation ID: [id number]         [facility]      Initial impression of presenting illness: This is a 29-year-old female who presents with complaints of right posterior rib pain after coughing.  Patient had x-rays which did not reveal any acute findings.  Patient felt much better after morphine and lidocaine patch.  Discussed that we are limited in pregnancy for pain medication also given Decadron for pleurisy component.  Recommended Tylenol and lidocaine patches.  Discussed that she may have a component of pleurisy rather than rib fractures. "  Discussed CT scan is more sensitive for rib fractures however do not think this is necessary at this time as she is not hypoxic and has atraumatic pain.    DDX: includes but is not limited to: Rib fracture, pleurisy, pneumothorax, pneumonia        Medications   sodium chloride 0.9 % flush 10 mL (has no administration in time range)   Lidocaine 4 % 1 patch (1 patch Transdermal Medication Applied 3/29/25 1043)   morphine injection 4 mg (4 mg Intravenous Given 3/29/25 1041)   dexAMETHasone (DECADRON) injection 10 mg (10 mg Intravenous Given 3/29/25 1135)       Data interpreted: Nursing notes reviewed, vital signs reviewed.  Labs independently interpreted by me (CBC, CMP, lipase, UA, troponin, ABG, lactic acid, procalcitonin).  Imaging independently interpreted by me (x-ray, CT scan).  EKG independently interpreted by me.  O2 saturation: 98%    Counseling: Discussed the results above with the patient regarding need for admission or discharge.  Patient understands and agrees plan of care.      -----  ED Disposition       ED Disposition   Discharge    Condition   Stable    Comment   --             Final diagnoses:   Rib pain on right side      Your Follow-Up Providers       Go to  [facility] EMERGENCY DEPARTMENT [place].    Specialty: Emergency Medicine  Follow up details: As needed, If symptoms worsen  [address]  [facility] [address] [phone]             Claudia Guallpa MD.    Specialties: Obstetrics and Gynecology, Gynecology  Follow up details: Go to scheduled appt for recheck  [address]  [address]  [facility] [id number] [phone]                       Contact information for after-discharge care    Follow-up information has not been specified.                    Your medication list        START taking these medications        Instructions Last Dose Given Next Dose Due   lidocaine 5 %  Commonly known as: Lidoderm      Place 1 patch on the skin as directed  by provider Daily. Remove & Discard patch within 12 hours or as directed by MD              CONTINUE taking these medications        Instructions Last Dose Given Next Dose Due   acetaminophen 650 MG 8 hr tablet  Commonly known as: TYLENOL      Take 1 tablet by mouth Every 8 (Eight) Hours As Needed for Mild Pain.       azithromycin 250 MG tablet  Commonly known as: ZITHROMAX  Start taking on: March 26, 2025      Take 2 tablets by mouth Daily for 1 day, THEN 1 tablet Daily for 4 days.       famotidine 40 MG tablet  Commonly known as: PEPCID      Take 1 tablet by mouth Daily.       guaiFENesin 600 MG 12 hr tablet  Commonly known as: MUCINEX      Take 2 tablets by mouth 2 (Two) Times a Day.       ondansetron ODT 4 MG disintegrating tablet  Commonly known as: ZOFRAN-ODT      Place 1 tablet on the tongue Every 8 (Eight) Hours for nausea and vomiting. Please use sparingly during pregnancy.       oseltamivir 75 MG capsule  Commonly known as: TAMIFLU      Take 1 capsule by mouth 2 (Two) Times a Day to treat flu.       Prenatal 27-1 27-1 MG tablet tablet      Take 1 tablet by mouth Daily.                 Where to Get Your Medications        These medications were sent to [facility] [address], [address], [place] [address]      Hours: Monday to Friday 9 AM to 5:30 PM Phone: [phone]   lidocaine 5 %

## 2025-03-29 NOTE — DISCHARGE INSTRUCTIONS
You are being discharged home with 1 medication.  Take this medication as prescribed.  Return to the emergency department for any worsening symptoms.

## 2025-03-30 ENCOUNTER — HOSPITAL ENCOUNTER (OUTPATIENT)
Facility: HOSPITAL | Age: 30
Discharge: HOME | End: 2025-03-30
Attending: OBSTETRICS & GYNECOLOGY | Admitting: OBSTETRICS & GYNECOLOGY
Payer: COMMERCIAL

## 2025-03-30 VITALS
SYSTOLIC BLOOD PRESSURE: 123 MMHG | DIASTOLIC BLOOD PRESSURE: 80 MMHG | OXYGEN SATURATION: 98 % | RESPIRATION RATE: 16 BRPM | HEART RATE: 100 BPM | TEMPERATURE: 98 F

## 2025-03-30 DIAGNOSIS — M94.0 COSTOCHONDRITIS: ICD-10-CM

## 2025-03-30 DIAGNOSIS — M94.0 COSTOCHONDRITIS, ACUTE: Primary | ICD-10-CM

## 2025-03-30 DIAGNOSIS — O36.8130 DECREASED FETAL MOVEMENTS IN THIRD TRIMESTER, SINGLE OR UNSPECIFIED FETUS: ICD-10-CM

## 2025-03-30 PROBLEM — O36.8190 DECREASED FETAL MOVEMENT: Status: ACTIVE | Noted: 2025-03-30

## 2025-03-30 LAB — POC AMNISURE: NEGATIVE

## 2025-03-30 PROCEDURE — G0378 HOSPITAL OBSERVATION PER HR: HCPCS

## 2025-03-30 PROCEDURE — 84112 EVAL AMNIOTIC FLUID PROTEIN: CPT | Performed by: OBSTETRICS & GYNECOLOGY

## 2025-03-30 PROCEDURE — G0463 HOSPITAL OUTPT CLINIC VISIT: HCPCS

## 2025-03-30 PROCEDURE — 59025 FETAL NON-STRESS TEST: CPT

## 2025-03-30 RX ORDER — METHYLPREDNISOLONE 4 MG/1
TABLET ORAL DAILY
Qty: 21 TABLET | Refills: 0 | Status: SHIPPED | OUTPATIENT
Start: 2025-03-30

## 2025-03-30 RX ORDER — CYCLOBENZAPRINE HCL 10 MG
10 TABLET ORAL 3 TIMES DAILY
Status: DISCONTINUED | OUTPATIENT
Start: 2025-03-30 | End: 2025-03-30 | Stop reason: HOSPADM

## 2025-03-30 RX ORDER — CYCLOBENZAPRINE HCL 10 MG
10 TABLET ORAL 3 TIMES DAILY
Qty: 9 TABLET | Refills: 0 | Status: SHIPPED | OUTPATIENT
Start: 2025-03-30

## 2025-03-30 RX ORDER — HYDROCODONE POLISTIREX AND CHLORPHENIRAMINE POLISTIREX 10; 8 MG/5ML; MG/5ML
5 SUSPENSION, EXTENDED RELEASE ORAL EVERY 12 HOURS PRN
Qty: 40 ML | Refills: 0 | Status: SHIPPED | OUTPATIENT
Start: 2025-03-30 | End: 2025-04-04

## 2025-03-30 RX ORDER — HYDROCODONE POLISTIREX AND CHLORPHENIRAMINE POLISTIREX 10; 8 MG/5ML; MG/5ML
5 SUSPENSION, EXTENDED RELEASE ORAL EVERY 12 HOURS PRN
Qty: 40 ML | Refills: 0 | OUTPATIENT
Start: 2025-03-30 | End: 2025-04-04

## 2025-03-30 RX ORDER — CYCLOBENZAPRINE HCL 10 MG
10 TABLET ORAL 3 TIMES DAILY
Qty: 9 TABLET | Refills: 0 | OUTPATIENT
Start: 2025-03-30

## 2025-03-30 RX ORDER — HYDROCODONE POLISTIREX AND CHLORPHENIRAMINE POLISTIREX 10; 8 MG/5ML; MG/5ML
5 SUSPENSION, EXTENDED RELEASE ORAL EVERY 12 HOURS PRN
Refills: 0 | Status: DISCONTINUED | OUTPATIENT
Start: 2025-03-30 | End: 2025-03-30 | Stop reason: HOSPADM

## 2025-03-30 RX ORDER — METHYLPREDNISOLONE 4 MG/1
TABLET ORAL
Qty: 21 TABLET | Refills: 0 | OUTPATIENT
Start: 2025-03-30

## 2025-03-30 RX ADMIN — HYDROCODONE POLISTIREX AND CHLORPHENIRAMINE POLISTIREX 5 ML: 10; 8 SUSPENSION, EXTENDED RELEASE ORAL at 13:27

## 2025-03-30 RX ADMIN — CYCLOBENZAPRINE HYDROCHLORIDE 10 MG: 10 TABLET, FILM COATED ORAL at 11:04

## 2025-03-30 NOTE — H&P
"[facility]  Obstetric History and Physical    Referring Provider: Claudia Guallpa MD      Chief Complaint   Patient presents with    Abdominal Pain    Decreased Fetal Movement       Subjective     Patient is a 29 y.o. female  currently at 35w3d, who presents with plaint decreased fetal movement and severe right sided chest wall pain.  Patient diagnosed with type a influenza on .  Was prescribed  Tamiflu, Robitussin, and a Z-Tom.  Patient seen in the ED yesterday with excruciating chest wall/rib pain chest x-ray was negative given Decadron IV lidocaine patches, morphine, and given reassurance.  Patient states was doing much better until earlier this morning when had a coughing spell where pain became more intense.  Patient denies fever, nausea, vomiting, and  vaginal bleeding.  Course complicated by obesity BMI 41 history of postpartum hemorrhage with prior pregnancy.  Of note, after arrival to triage patient had a coughing spell and noticed leaking of fluid unsure if it urine        The following portions of the patients history were reviewed and updated as appropriate: current medications, allergies, past medical history, past surgical history, past family history, past social history, and problem list .       Prenatal Information:   Maternal Prenatal Labs  Blood Type No results found for: \"ABO\"   Rh Status No results found for: \"RH\"   Antibody Screen No results found for: \"ABSCRN\"   Gonnorhea No results found for: \"GCCX\"   Chlamydia No results found for: \"CLAMYDCU\"   RPR No results found for: \"RPR\"   Syphilis Antibody No results found for: \"SYPHILIS\"   Rubella No results found for: \"RUBELLAIGGIN\"   Hepatitis B Surface Antigen No results found for: \"HEPBSAG\"   HIV-1 Antibody No results found for: \"LABHIV1\"   Hepatitis C Antibody No results found for: \"HEPCAB\"   Rapid Urin Drug Screen No results found for: \"AMPMETHU\", \"BARBITSCNUR\", \"LABBENZSCN\", \"LABMETHSCN\", \"LABOPIASCN\", \"THCURSCR\", \"COCAINEUR\", " "\"AMPHETSCREEN\", \"PROPOXSCN\", \"BUPRENORSCNU\", \"METAMPSCNUR\", \"OXYCODONESCN\", \"TRICYCLICSCN\"   Group B Strep Culture No results found for: \"GBSANTIGEN\"           External Prenatal Results       Pregnancy Outside Results - Transcribed From Office Records - See Scanned Records For Details       Test Value Date Time    ABO  A  10/02/24 1300    Rh  Positive  10/02/24 1300    Antibody Screen  Negative  02/06/25 1138       Negative  10/02/24 1300    Varicella IgG  595 index 08/13/24 1255    Rubella  <0.90 index 10/02/24 1300       1.01 index 08/13/24 1255    Hgb  12.2 g/dL 03/18/25 2041       12.7 g/dL 03/06/25 1602       12.2 g/dL 02/06/25 1138       13.2 g/dL 11/05/24 1148       13.1 g/dL 10/02/24 1300    Hct  35.4 % 03/18/25 2041       38.9 % 03/06/25 1602       35.3 % 02/06/25 1138       39.7 % 11/05/24 1148       42.2 % 10/02/24 1300    HgB A1c        1h GTT  194 mg/dL 02/06/25 1138    3h GTT Fasting  79 mg/dL 02/10/25 1442    3h GTT 1 hour  203 mg/dL 02/10/25 1442    3h GTT 2 hour  140 mg/dL 02/10/25 1442    3h GTT 3 hour   85 mg/dL 02/10/25 1442    Gonorrhea (discrete)  Negative  10/02/24 1300    Chlamydia (discrete)  Negative  10/02/24 1300    RPR  Non Reactive  02/06/25 1138       Non Reactive  10/02/24 1300    Syphils cascade: TP-Ab (FTA)       TP-Ab       TP-Ab (EIA)       TPPA       HBsAg  Negative  10/02/24 1300    Herpes Simplex Virus PCR       Herpes Simplex VIrus Culture       HIV  Non Reactive  10/02/24 1300    Hep C RNA Quant PCR       Hep C Antibody  Non Reactive  10/02/24 1300    AFP       NIPT       Cystic Fibrosis (Jimbo)       Cystic Fibroisis        Spinal Muscular atrophy       Fragile X       Group B Strep       GBS Susceptibility to Clindamycin       GBS Susceptibility to Erythromycin       Fetal Fibronectin       Genetic Testing, Maternal Blood                 Drug Screening       Test Value Date Time    Urine Drug Screen       Amphetamine Screen  Negative ng/mL 10/02/24 1300    Barbiturate " Screen  Negative ng/mL 10/02/24 1300    Benzodiazepine Screen  Negative ng/mL 10/02/24 1300    Methadone Screen  Negative ng/mL 10/02/24 1300    Phencyclidine Screen  Negative ng/mL 10/02/24 1300    Opiates Screen       THC Screen       Cocaine Screen       Propoxyphene Screen  Negative ng/mL 10/02/24 1300    Buprenorphine Screen       Methamphetamine Screen       Oxycodone Screen       Tricyclic Antidepressants Screen                 Legend    ^: Historical                              Past OB History:       OB History    Para Term  AB Living   4 3 3 0 0 3   SAB IAB Ectopic Molar Multiple Live Births   0 0 0 0 0 3      # Outcome Date GA Lbr Oscar/2nd Weight Sex Type Anes PTL Lv   4 Current            3 Term 10/19/17 39w4d 06:46 / 00:08 3145 g (6 lb 14.9 oz) F Vaginal unsp EPI N JAY      Name: RICHARD TAY GIRL       Apgar1: 9  Apgar5: 9   2 Term 16 39w1d 03:14 / 00:16 3371 g (7 lb 6.9 oz) F Vag-Spont EPI Y JAY      Name: Rylee      Apgar1: 8  Apgar5: 9   1 Term 11 42w0d 16:00 / 00:45 3345 g (7 lb 6 oz) F Vag-Spont EPI N JAY      Complications: Other Excessive Bleeding, Postpartum Hemorrhage      Name: zachariah       Past Medical History: Past Medical History:   Diagnosis Date    Anxiety 2006    Depression     GERD (gastroesophageal reflux disease)     History of postpartum hemorrhage 2011    IBS (irritable bowel syndrome)       Past Surgical History Past Surgical History:   Procedure Laterality Date    MOUTH SURGERY  2024    ROOT CANAL      x3    WISDOM TOOTH EXTRACTION  2019      Family History: Family History   Problem Relation Age of Onset    Stroke Father     Hypertension Father     Heart disease Father     Hypertension Mother      labor Mother     Stroke Maternal Grandmother     Diabetes Maternal Grandmother     Stroke Maternal Grandfather       Social History:  reports that she has never smoked. She has never used smokeless tobacco.   reports that she does not  currently use alcohol.   reports no history of drug use.                   General ROS Negative Findings:Headaches, Visual Changes, Epigastric pain, Anorexia, Nausia/Vomiting, and Vaginal Bleeding    ROS     All other systems have been reviewed and are neg  Objective       Vital Signs Range for the last 24 hours  Temperature: Temp:  [98 °F (36.7 °C)] 98 °F (36.7 °C)   Temp Source: Temp src: Oral   BP: BP: (118-125)/(88-92) 118/92   Pulse: Heart Rate:  [] 118   Respirations: Resp:  [20] 20   SPO2: SpO2:  [96 %] 96 %   O2 Amount (l/min):     O2 Devices     Weight:       Physical Examination:   General:   alert, appears stated age, and cooperative   Skin:   normal   HEENT:     Lungs:   clear to auscultation bilaterally   Heart:   regular rate and rhythm, S1, S2 normal, no murmur, click, rub or gallop   Gastrointestinal: Soft nontender   Lower Extremities:    : AmniSure neg   Musculoskeletal:  Point tenderness noted over the right lateral chest wall    Neuro:           Presentation: breech   Cervix: Exam by:     Dilation:     Effacement:     Station:         Fetal Heart Rate Assessment   Method: Fetal HR Assessment Method: external   Beats/min: Fetal HR (beats/min): 145   Baseline: Fetal HR Baseline: normal range   Varibility: Fetal HR Variability: moderate (amplitude range 6 to 25 bpm)   Accels: Fetal HR Accelerations: greater than/equal to 15 bpm, lasting at least 15 seconds   Decels: Fetal HR Decelerations: absent   Tracing Category:     C-indicates decreased fetal movement, interpretation reactive, moderate variability, accelerations present 15 x 15, no fetal deceleration noted, onset 0932 endtime 0952 no ctx noted   Uterine Assessment   Method: Method: palpation   Frequency (min):     Ctx Count in 10 min:     Duration:     Intensity: Contraction Intensity: no contractions   Intensity by IUPC:     Resting Tone: Uterine Resting Tone: soft by palpation   Resting Tone by IUPC:     Brusett Units:        Laboratory Results:   Lab Results (last 24 hours)       ** No results found for the last 24 hours. **          Radiology Review:   Imaging Results (Last 24 Hours)       ** No results found for the last 24 hours. **          Other Studies: Ultrasound breech presentation .  Grossly appearing amniotic fluid    Assessment & Plan       Morbid obesity with BMI of 40.0-44.9, adult    Decreased fetal movement        Assessment:  1.  Intrauterine pregnancy at 35w3d gestation with reactive fetal status.    2.  Decreased fetal movement-resolved  3.  Costochondritis   4.  Recovering from type A influenza   5.  Maternal and fetal wellbeing establish  6.  Breech presentation  Plan:  1.  Discharge to home, rest, continue Tamiflu and lidocaine patch, Tylenol as needed, Rx Flexeril dispense 9, Tussionex p.o. twice daily dispense  , Medrol Dosepak, kick count, labor instructions given, follow-up with OB provider this week or as needed.  Note given for work  2. Plan of care has been reviewed with patient.  3.  Risks, benefits of treatment plan have been discussed.  4.  All questions have been answered.  5      Deshawn Chaudhari, DO  3/30/2025  10:57 EDT

## 2025-04-03 ENCOUNTER — ROUTINE PRENATAL (OUTPATIENT)
Dept: OBSTETRICS AND GYNECOLOGY | Facility: CLINIC | Age: 30
End: 2025-04-03
Payer: COMMERCIAL

## 2025-04-03 ENCOUNTER — LAB (OUTPATIENT)
Dept: LAB | Facility: HOSPITAL | Age: 30
End: 2025-04-03
Payer: COMMERCIAL

## 2025-04-03 VITALS — DIASTOLIC BLOOD PRESSURE: 86 MMHG | SYSTOLIC BLOOD PRESSURE: 128 MMHG | BODY MASS INDEX: 41.7 KG/M2 | WEIGHT: 228 LBS

## 2025-04-03 DIAGNOSIS — Z34.83 PRENATAL CARE, SUBSEQUENT PREGNANCY IN THIRD TRIMESTER: Primary | ICD-10-CM

## 2025-04-03 DIAGNOSIS — E66.01 MORBID OBESITY WITH BMI OF 40.0-44.9, ADULT: ICD-10-CM

## 2025-04-03 PROCEDURE — 87081 CULTURE SCREEN ONLY: CPT

## 2025-04-03 NOTE — PROGRESS NOTES
OB FOLLOW UP  CC- Here for care of pregnancy        Kathy Anthony is a 29 y.o.  36w0d patient being seen today for her obstetrical follow up visit. Patient reports occasional headaches without vision changes that are relieved with Tylenol. Patient also reports occasional irregular ctx.     Her prenatal care is complicated by (and status) : see below.  Patient Active Problem List   Diagnosis    History of postpartum hemorrhage    Morbid obesity with BMI of 40.0-44.9, adult    Left upper quadrant abdominal pain affecting pregnancy in third trimester    Decreased fetal movement    Maternal care for breech presentation, single gestation       GBS Status: Done Today. She is not allergic to PCN.    No Known Allergies       Flu Status: Already given in current flu season  Her Delivery Plan is: Undecided    US today: no  Non Stress Test: No.    ROS -   Patient Denies: Loss of Fluid, Vaginal Spotting, Vision Changes, Nausea , Vomiting , Contractions, Epigastric pain, and skin itching  Fetal Movement : normal  Other than what is documented in the HPI, all other systems reviewed and are negative.       The additional following portions of the patient's history were reviewed and updated as appropriate: allergies and current medications.    I have reviewed and agree with the HPI, ROS, and historical information as entered above. Claudia Guallpa MD        EXAM:     Prenatal Vitals  BP: 128/86  Weight: 103 kg (228 lb)   Fetal Heart Rate: pos       Dilation/Effacement/Station  Dilation: 2                Assessment and Plan    Problem List Items Addressed This Visit          Endocrine and Metabolic    Morbid obesity with BMI of 40.0-44.9, adult    Relevant Orders    US Ob Follow Up Transabdominal Approach       Gravid and     Maternal care for breech presentation, single gestation    Relevant Orders    US Ob Follow Up Transabdominal Approach     Other Visit Diagnoses         Prenatal care, subsequent pregnancy in  third trimester    -  Primary            Pregnancy at 36w0d  Fetal status reassuring.   Reviewed Pre-eclampsia signs/symptoms  Delivery options reviewed with patient  Signs of labor reviewed  Kick counts reviewed  Activity and Exercise discussed.  Return in about 1 week (around 4/10/2025) for US with Next Visit.    Claudia Guallpa MD  04/03/2025

## 2025-04-06 LAB — BACTERIA SPEC AEROBE CULT: NORMAL

## 2025-04-10 ENCOUNTER — ROUTINE PRENATAL (OUTPATIENT)
Dept: OBSTETRICS AND GYNECOLOGY | Facility: CLINIC | Age: 30
End: 2025-04-10
Payer: COMMERCIAL

## 2025-04-10 VITALS — WEIGHT: 227.2 LBS | BODY MASS INDEX: 41.56 KG/M2 | SYSTOLIC BLOOD PRESSURE: 118 MMHG | DIASTOLIC BLOOD PRESSURE: 90 MMHG

## 2025-04-10 DIAGNOSIS — Z34.90 PRENATAL CARE, ANTEPARTUM, UNSPECIFIED GRAVIDITY: Primary | ICD-10-CM

## 2025-04-10 DIAGNOSIS — E66.01 MORBID OBESITY WITH BMI OF 40.0-44.9, ADULT: ICD-10-CM

## 2025-04-10 LAB
GLUCOSE UR STRIP-MCNC: NEGATIVE MG/DL
PROT UR STRIP-MCNC: NEGATIVE MG/DL

## 2025-04-10 NOTE — PROGRESS NOTES
OB FOLLOW UP  CC- Here for care of pregnancy        Kathy Anthony is a 29 y.o.  37w0d patient being seen today for her obstetrical follow up visit. Patient reports that she is stressed due baby being breech. She reports increase in N/V. No increase in epigastric pain.     Her prenatal care is complicated by (and status) :   Patient Active Problem List   Diagnosis    History of postpartum hemorrhage    Morbid obesity with BMI of 40.0-44.9, adult    Left upper quadrant abdominal pain affecting pregnancy in third trimester    Decreased fetal movement    Maternal care for breech presentation, single gestation       GBS Status: negative  Group B Strep Culture   Date Value Ref Range Status   2025 No Group B Streptococcus isolated  Final         No Known Allergies       Her Delivery Plan is:  IOL 25     US today: yes. Breech. EFW 37%  Non Stress Test: No.    ROS -   Patient Denies: Loss of Fluid, Vaginal Spotting, Vision Changes, Headaches, and skin itching  Fetal Movement : normal  Other than what is documented in the HPI, all other systems reviewed and are negative.       The additional following portions of the patient's history were reviewed and updated as appropriate: allergies, current medications, past family history, past medical history, past social history, past surgical history, and problem list.    I have reviewed and agree with the HPI, ROS, and historical information as entered above. Sushant Bergman, APRN        EXAM:     Prenatal Vitals  BP: 118/90 (pt crying and upset)  Weight: 103 kg (227 lb 3.2 oz)   Fetal Heart Rate: 138       Dilation/Effacement/Station  Dilation: 2      Urine Glucose Read-only: Negative  Urine Protein Read-only: Negative           Assessment and Plan    Problem List Items Addressed This Visit       Morbid obesity with BMI of 40.0-44.9, adult    Maternal care for breech presentation, single gestation     Other Visit Diagnoses         Prenatal care,  antepartum, unspecified     -  Primary    Relevant Orders    POC Urinalysis Dipstick (Completed)            Pregnancy at 37w0d  Fetal status reassuring. Still breech.  Reviewed Pre-eclampsia signs/symptoms  Delivery options reviewed with patient  Signs of labor reviewed  Kick counts reviewed  Activity and Exercise discussed.  Return in about 1 week (around 2025).    Sushant Bergman, APRN  04/10/2025

## 2025-04-16 ENCOUNTER — ANESTHESIA EVENT (OUTPATIENT)
Dept: LABOR AND DELIVERY | Facility: HOSPITAL | Age: 30
End: 2025-04-16
Payer: COMMERCIAL

## 2025-04-16 ENCOUNTER — TELEPHONE (OUTPATIENT)
Dept: OBSTETRICS AND GYNECOLOGY | Facility: CLINIC | Age: 30
End: 2025-04-16
Payer: COMMERCIAL

## 2025-04-16 ENCOUNTER — HOSPITAL ENCOUNTER (INPATIENT)
Facility: HOSPITAL | Age: 30
LOS: 4 days | Discharge: HOME | End: 2025-04-20
Attending: OBSTETRICS & GYNECOLOGY | Admitting: OBSTETRICS & GYNECOLOGY
Payer: COMMERCIAL

## 2025-04-16 ENCOUNTER — ANESTHESIA (OUTPATIENT)
Dept: LABOR AND DELIVERY | Facility: HOSPITAL | Age: 30
End: 2025-04-16
Payer: COMMERCIAL

## 2025-04-16 ENCOUNTER — ROUTINE PRENATAL (OUTPATIENT)
Dept: OBSTETRICS AND GYNECOLOGY | Facility: CLINIC | Age: 30
End: 2025-04-16
Payer: COMMERCIAL

## 2025-04-16 VITALS — BODY MASS INDEX: 41.15 KG/M2 | WEIGHT: 225 LBS | SYSTOLIC BLOOD PRESSURE: 118 MMHG | DIASTOLIC BLOOD PRESSURE: 82 MMHG

## 2025-04-16 DIAGNOSIS — R10.9 CRAMPING AFFECTING PREGNANCY, ANTEPARTUM: Primary | ICD-10-CM

## 2025-04-16 DIAGNOSIS — R82.90 ABNORMAL URINALYSIS: ICD-10-CM

## 2025-04-16 DIAGNOSIS — Z34.83 PRENATAL CARE, SUBSEQUENT PREGNANCY IN THIRD TRIMESTER: ICD-10-CM

## 2025-04-16 DIAGNOSIS — Z98.891 S/P C-SECTION: Primary | ICD-10-CM

## 2025-04-16 DIAGNOSIS — O26.899 CRAMPING AFFECTING PREGNANCY, ANTEPARTUM: Primary | ICD-10-CM

## 2025-04-16 PROBLEM — O36.8190 DECREASED FETAL MOVEMENT: Status: RESOLVED | Noted: 2025-03-30 | Resolved: 2025-04-16

## 2025-04-16 PROBLEM — O26.893: Status: RESOLVED | Noted: 2025-03-18 | Resolved: 2025-04-16

## 2025-04-16 PROBLEM — R10.12: Status: RESOLVED | Noted: 2025-03-18 | Resolved: 2025-04-16

## 2025-04-16 LAB
ABO GROUP BLD: NORMAL
ABO GROUP BLD: NORMAL
ALP SERPL-CCNC: 137 U/L (ref 39–117)
ALT SERPL W P-5'-P-CCNC: 17 U/L (ref 1–33)
AST SERPL-CCNC: 53 U/L (ref 1–32)
ATMOSPHERIC PRESS: ABNORMAL MM[HG]
BASE EXCESS BLDCOV CALC-SCNC: -9.3 MMOL/L (ref 0–2)
BDY SITE: ABNORMAL
BILIRUB BLD-MCNC: NEGATIVE MG/DL
BILIRUB SERPL-MCNC: 0.2 MG/DL (ref 0–1.2)
BLD GP AB SCN SERPL QL: NEGATIVE
BODY TEMPERATURE: 37
CLARITY, POC: CLEAR
CO2 BLDA-SCNC: 19.9 MMOL/L (ref 22–33)
COLOR UR: YELLOW
CREAT SERPL-MCNC: 0.79 MG/DL (ref 0.57–1)
DEPRECATED RDW RBC AUTO: 47.5 FL (ref 37–54)
EPAP: 0
ERYTHROCYTE [DISTWIDTH] IN BLOOD BY AUTOMATED COUNT: 14.6 % (ref 12.3–15.4)
GLUCOSE UR STRIP-MCNC: NEGATIVE MG/DL
HCO3 BLDCOV-SCNC: 18.5 MMOL/L (ref 18.6–21.4)
HCT VFR BLD AUTO: 39.9 % (ref 34–46.6)
HGB BLD-MCNC: 13.7 G/DL (ref 12–15.9)
HGB BLDA-MCNC: 14 G/DL (ref 14–18)
INHALED O2 CONCENTRATION: 21 %
IPAP: 0
KETONES UR QL: ABNORMAL
LDH SERPL-CCNC: 230 U/L (ref 135–214)
LEUKOCYTE EST, POC: ABNORMAL
MCH RBC QN AUTO: 30.7 PG (ref 26.6–33)
MCHC RBC AUTO-ENTMCNC: 34.3 G/DL (ref 31.5–35.7)
MCV RBC AUTO: 89.5 FL (ref 79–97)
MODALITY: ABNORMAL
NITRITE UR-MCNC: NEGATIVE MG/ML
PAW @ PEAK INSP FLOW SETTING VENT: 0 CMH2O
PCO2 BLDCOV: 46 MM HG (ref 28–40)
PH BLDCOV: 7.21 PH UNITS (ref 7.31–7.37)
PH UR: 6 [PH] (ref 5–8)
PLATELET # BLD AUTO: 215 10*3/MM3 (ref 140–450)
PMV BLD AUTO: 11.3 FL (ref 6–12)
PO2 BLDCOV: 29.8 MM HG (ref 21–31)
PROT UR STRIP-MCNC: ABNORMAL MG/DL
RBC # BLD AUTO: 4.46 10*6/MM3 (ref 3.77–5.28)
RBC # UR STRIP: NEGATIVE /UL
RH BLD: POSITIVE
RH BLD: POSITIVE
SAO2 % BLDCOA: ABNORMAL %
SP GR UR: 1.02 (ref 1–1.03)
T&S EXPIRATION DATE: NORMAL
TOTAL RATE: 0 BREATHS/MINUTE
URATE SERPL-MCNC: 8.7 MG/DL (ref 2.4–5.7)
UROBILINOGEN UR QL: NORMAL
VENTILATOR MODE: ABNORMAL
WBC NRBC COR # BLD AUTO: 9.31 10*3/MM3 (ref 3.4–10.8)

## 2025-04-16 PROCEDURE — 59412 ANTEPARTUM MANIPULATION: CPT | Performed by: OBSTETRICS & GYNECOLOGY

## 2025-04-16 PROCEDURE — 25010000002 MORPHINE PER 10 MG: Performed by: ANESTHESIOLOGY

## 2025-04-16 PROCEDURE — 83615 LACTATE (LD) (LDH) ENZYME: CPT | Performed by: OBSTETRICS & GYNECOLOGY

## 2025-04-16 PROCEDURE — 82565 ASSAY OF CREATININE: CPT | Performed by: OBSTETRICS & GYNECOLOGY

## 2025-04-16 PROCEDURE — 85027 COMPLETE CBC AUTOMATED: CPT | Performed by: OBSTETRICS & GYNECOLOGY

## 2025-04-16 PROCEDURE — 86900 BLOOD TYPING SEROLOGIC ABO: CPT

## 2025-04-16 PROCEDURE — 25010000002 TERBUTALINE PER 1 MG: Performed by: OBSTETRICS & GYNECOLOGY

## 2025-04-16 PROCEDURE — 59510 CESAREAN DELIVERY: CPT | Performed by: OBSTETRICS & GYNECOLOGY

## 2025-04-16 PROCEDURE — C1755 CATHETER, INTRASPINAL: HCPCS | Performed by: ANESTHESIOLOGY

## 2025-04-16 PROCEDURE — 86850 RBC ANTIBODY SCREEN: CPT | Performed by: OBSTETRICS & GYNECOLOGY

## 2025-04-16 PROCEDURE — 59514 CESAREAN DELIVERY ONLY: CPT | Performed by: OBSTETRICS & GYNECOLOGY

## 2025-04-16 PROCEDURE — 25010000002 METOCLOPRAMIDE PER 10 MG: Performed by: ANESTHESIOLOGY

## 2025-04-16 PROCEDURE — 25010000002 FAMOTIDINE (PF) 20 MG/2ML SOLUTION: Performed by: ANESTHESIOLOGY

## 2025-04-16 PROCEDURE — 25010000002 ONDANSETRON PER 1 MG: Performed by: ANESTHESIOLOGY

## 2025-04-16 PROCEDURE — S0260 H&P FOR SURGERY: HCPCS | Performed by: OBSTETRICS & GYNECOLOGY

## 2025-04-16 PROCEDURE — 25010000002 LIDOCAINE-EPINEPHRINE (PF) 2 %-1:200000 SOLUTION: Performed by: ANESTHESIOLOGY

## 2025-04-16 PROCEDURE — 25010000002 KETOROLAC TROMETHAMINE PER 15 MG: Performed by: OBSTETRICS & GYNECOLOGY

## 2025-04-16 PROCEDURE — 86901 BLOOD TYPING SEROLOGIC RH(D): CPT | Performed by: OBSTETRICS & GYNECOLOGY

## 2025-04-16 PROCEDURE — 25010000002 FENTANYL CITRATE (PF) 100 MCG/2ML SOLUTION: Performed by: ANESTHESIOLOGY

## 2025-04-16 PROCEDURE — 86901 BLOOD TYPING SEROLOGIC RH(D): CPT

## 2025-04-16 PROCEDURE — 84550 ASSAY OF BLOOD/URIC ACID: CPT | Performed by: OBSTETRICS & GYNECOLOGY

## 2025-04-16 PROCEDURE — 36415 COLL VENOUS BLD VENIPUNCTURE: CPT | Performed by: OBSTETRICS & GYNECOLOGY

## 2025-04-16 PROCEDURE — 25810000003 LACTATED RINGERS PER 1000 ML: Performed by: ANESTHESIOLOGY

## 2025-04-16 PROCEDURE — 82247 BILIRUBIN TOTAL: CPT | Performed by: OBSTETRICS & GYNECOLOGY

## 2025-04-16 PROCEDURE — 84075 ASSAY ALKALINE PHOSPHATASE: CPT | Performed by: OBSTETRICS & GYNECOLOGY

## 2025-04-16 PROCEDURE — 82805 BLOOD GASES W/O2 SATURATION: CPT

## 2025-04-16 PROCEDURE — 84450 TRANSFERASE (AST) (SGOT): CPT | Performed by: OBSTETRICS & GYNECOLOGY

## 2025-04-16 PROCEDURE — 25010000002 CEFAZOLIN IN DEXTROSE 2-4 GM/100ML-% SOLUTION: Performed by: ANESTHESIOLOGY

## 2025-04-16 PROCEDURE — 25010000002 LIDOCAINE-EPINEPHRINE (PF) 1.5 %-1:200000 SOLUTION: Performed by: ANESTHESIOLOGY

## 2025-04-16 PROCEDURE — 86780 TREPONEMA PALLIDUM: CPT | Performed by: OBSTETRICS & GYNECOLOGY

## 2025-04-16 PROCEDURE — 25010000002 METHYLERGONOVINE MALEATE PER 0.2 MG: Performed by: ANESTHESIOLOGY

## 2025-04-16 PROCEDURE — 10S0XZZ REPOSITION PRODUCTS OF CONCEPTION, EXTERNAL APPROACH: ICD-10-PCS | Performed by: OBSTETRICS & GYNECOLOGY

## 2025-04-16 PROCEDURE — 84460 ALANINE AMINO (ALT) (SGPT): CPT | Performed by: OBSTETRICS & GYNECOLOGY

## 2025-04-16 PROCEDURE — 86900 BLOOD TYPING SEROLOGIC ABO: CPT | Performed by: OBSTETRICS & GYNECOLOGY

## 2025-04-16 RX ORDER — TERBUTALINE SULFATE 1 MG/ML
0.25 INJECTION SUBCUTANEOUS ONCE
Status: COMPLETED | OUTPATIENT
Start: 2025-04-16 | End: 2025-04-16

## 2025-04-16 RX ORDER — HYDROMORPHONE HYDROCHLORIDE 1 MG/ML
0.5 INJECTION, SOLUTION INTRAMUSCULAR; INTRAVENOUS; SUBCUTANEOUS
Status: DISCONTINUED | OUTPATIENT
Start: 2025-04-16 | End: 2025-04-16

## 2025-04-16 RX ORDER — IBUPROFEN 600 MG/1
600 TABLET, FILM COATED ORAL EVERY 6 HOURS
Status: DISCONTINUED | OUTPATIENT
Start: 2025-04-18 | End: 2025-04-20 | Stop reason: HOSPADM

## 2025-04-16 RX ORDER — KETOROLAC TROMETHAMINE 30 MG/ML
30 INJECTION, SOLUTION INTRAMUSCULAR; INTRAVENOUS ONCE
Status: COMPLETED | OUTPATIENT
Start: 2025-04-16 | End: 2025-04-16

## 2025-04-16 RX ORDER — HYDROCORTISONE 25 MG/G
1 CREAM TOPICAL AS NEEDED
Status: DISCONTINUED | OUTPATIENT
Start: 2025-04-16 | End: 2025-04-20 | Stop reason: HOSPADM

## 2025-04-16 RX ORDER — OXYTOCIN/0.9 % SODIUM CHLORIDE 30/500 ML
125 PLASTIC BAG, INJECTION (ML) INTRAVENOUS ONCE AS NEEDED
Status: DISCONTINUED | OUTPATIENT
Start: 2025-04-16 | End: 2025-04-20 | Stop reason: HOSPADM

## 2025-04-16 RX ORDER — DOCUSATE SODIUM 100 MG/1
100 CAPSULE, LIQUID FILLED ORAL 2 TIMES DAILY PRN
Status: DISCONTINUED | OUTPATIENT
Start: 2025-04-16 | End: 2025-04-20 | Stop reason: HOSPADM

## 2025-04-16 RX ORDER — FAMOTIDINE 10 MG/ML
INJECTION, SOLUTION INTRAVENOUS AS NEEDED
Status: DISCONTINUED | OUTPATIENT
Start: 2025-04-16 | End: 2025-04-16 | Stop reason: SURG

## 2025-04-16 RX ORDER — MORPHINE SULFATE 0.5 MG/ML
INJECTION, SOLUTION EPIDURAL; INTRATHECAL; INTRAVENOUS AS NEEDED
Status: DISCONTINUED | OUTPATIENT
Start: 2025-04-16 | End: 2025-04-16 | Stop reason: SURG

## 2025-04-16 RX ORDER — ENOXAPARIN SODIUM 100 MG/ML
40 INJECTION SUBCUTANEOUS EVERY 12 HOURS
Status: DISCONTINUED | OUTPATIENT
Start: 2025-04-17 | End: 2025-04-20 | Stop reason: HOSPADM

## 2025-04-16 RX ORDER — FENTANYL CITRATE 50 UG/ML
INJECTION, SOLUTION INTRAMUSCULAR; INTRAVENOUS AS NEEDED
Status: DISCONTINUED | OUTPATIENT
Start: 2025-04-16 | End: 2025-04-16 | Stop reason: SURG

## 2025-04-16 RX ORDER — SIMETHICONE 80 MG
80 TABLET,CHEWABLE ORAL 4 TIMES DAILY PRN
Status: DISCONTINUED | OUTPATIENT
Start: 2025-04-16 | End: 2025-04-20 | Stop reason: HOSPADM

## 2025-04-16 RX ORDER — SODIUM CHLORIDE, SODIUM LACTATE, POTASSIUM CHLORIDE, CALCIUM CHLORIDE 600; 310; 30; 20 MG/100ML; MG/100ML; MG/100ML; MG/100ML
INJECTION, SOLUTION INTRAVENOUS CONTINUOUS PRN
Status: DISCONTINUED | OUTPATIENT
Start: 2025-04-16 | End: 2025-04-16 | Stop reason: SURG

## 2025-04-16 RX ORDER — ONDANSETRON 2 MG/ML
INJECTION INTRAMUSCULAR; INTRAVENOUS AS NEEDED
Status: DISCONTINUED | OUTPATIENT
Start: 2025-04-16 | End: 2025-04-16 | Stop reason: SURG

## 2025-04-16 RX ORDER — EPHEDRINE SULFATE 5 MG/ML
10 INJECTION INTRAVENOUS AS NEEDED
Status: DISCONTINUED | OUTPATIENT
Start: 2025-04-16 | End: 2025-04-16

## 2025-04-16 RX ORDER — LIDOCAINE HCL/EPINEPHRINE/PF 2%-1:200K
VIAL (ML) INJECTION AS NEEDED
Status: DISCONTINUED | OUTPATIENT
Start: 2025-04-16 | End: 2025-04-16 | Stop reason: SURG

## 2025-04-16 RX ORDER — MISOPROSTOL 200 UG/1
600 TABLET ORAL AS NEEDED
Status: DISCONTINUED | OUTPATIENT
Start: 2025-04-16 | End: 2025-04-20 | Stop reason: HOSPADM

## 2025-04-16 RX ORDER — OXYCODONE HYDROCHLORIDE 5 MG/1
5 TABLET ORAL EVERY 4 HOURS PRN
Status: DISCONTINUED | OUTPATIENT
Start: 2025-04-16 | End: 2025-04-20 | Stop reason: HOSPADM

## 2025-04-16 RX ORDER — ACETAMINOPHEN 325 MG/1
650 TABLET ORAL EVERY 6 HOURS
Status: DISCONTINUED | OUTPATIENT
Start: 2025-04-17 | End: 2025-04-20 | Stop reason: HOSPADM

## 2025-04-16 RX ORDER — BUPIVACAINE HCL/0.9 % NACL/PF 0.125 %
PLASTIC BAG, INJECTION (ML) EPIDURAL AS NEEDED
Status: DISCONTINUED | OUTPATIENT
Start: 2025-04-16 | End: 2025-04-16 | Stop reason: SURG

## 2025-04-16 RX ORDER — METHYLERGONOVINE MALEATE 0.2 MG/ML
INJECTION INTRAVENOUS
Status: COMPLETED
Start: 2025-04-16 | End: 2025-04-16

## 2025-04-16 RX ORDER — ACETAMINOPHEN 500 MG
1000 TABLET ORAL EVERY 6 HOURS
Status: COMPLETED | OUTPATIENT
Start: 2025-04-16 | End: 2025-04-17

## 2025-04-16 RX ORDER — METHYLERGONOVINE MALEATE 0.2 MG/ML
INJECTION INTRAVENOUS AS NEEDED
Status: DISCONTINUED | OUTPATIENT
Start: 2025-04-16 | End: 2025-04-16 | Stop reason: SURG

## 2025-04-16 RX ORDER — CEFAZOLIN SODIUM 2 G/100ML
INJECTION, SOLUTION INTRAVENOUS AS NEEDED
Status: DISCONTINUED | OUTPATIENT
Start: 2025-04-16 | End: 2025-04-16 | Stop reason: SURG

## 2025-04-16 RX ORDER — KETOROLAC TROMETHAMINE 15 MG/ML
15 INJECTION, SOLUTION INTRAMUSCULAR; INTRAVENOUS EVERY 6 HOURS
Status: COMPLETED | OUTPATIENT
Start: 2025-04-17 | End: 2025-04-17

## 2025-04-16 RX ORDER — HYDROXYZINE HYDROCHLORIDE 25 MG/1
50 TABLET, FILM COATED ORAL EVERY 6 HOURS PRN
Status: DISCONTINUED | OUTPATIENT
Start: 2025-04-16 | End: 2025-04-20 | Stop reason: HOSPADM

## 2025-04-16 RX ORDER — NITROFURANTOIN 25; 75 MG/1; MG/1
100 CAPSULE ORAL 2 TIMES DAILY
Qty: 14 CAPSULE | Refills: 0 | Status: ON HOLD | OUTPATIENT
Start: 2025-04-16 | End: 2025-04-16

## 2025-04-16 RX ORDER — METOCLOPRAMIDE HYDROCHLORIDE 5 MG/ML
INJECTION INTRAMUSCULAR; INTRAVENOUS AS NEEDED
Status: DISCONTINUED | OUTPATIENT
Start: 2025-04-16 | End: 2025-04-16 | Stop reason: SURG

## 2025-04-16 RX ORDER — PROMETHAZINE HYDROCHLORIDE 25 MG/1
25 TABLET ORAL ONCE AS NEEDED
Status: DISCONTINUED | OUTPATIENT
Start: 2025-04-16 | End: 2025-04-20 | Stop reason: HOSPADM

## 2025-04-16 RX ORDER — CARBOPROST TROMETHAMINE 250 UG/ML
250 INJECTION, SOLUTION INTRAMUSCULAR AS NEEDED
Status: DISCONTINUED | OUTPATIENT
Start: 2025-04-16 | End: 2025-04-20 | Stop reason: HOSPADM

## 2025-04-16 RX ORDER — PROMETHAZINE HYDROCHLORIDE 12.5 MG/1
12.5 SUPPOSITORY RECTAL ONCE AS NEEDED
Status: DISCONTINUED | OUTPATIENT
Start: 2025-04-16 | End: 2025-04-20 | Stop reason: HOSPADM

## 2025-04-16 RX ORDER — OXYTOCIN/0.9 % SODIUM CHLORIDE 30/500 ML
PLASTIC BAG, INJECTION (ML) INTRAVENOUS AS NEEDED
Status: DISCONTINUED | OUTPATIENT
Start: 2025-04-16 | End: 2025-04-16 | Stop reason: SURG

## 2025-04-16 RX ORDER — ONDANSETRON 4 MG/1
4 TABLET, ORALLY DISINTEGRATING ORAL EVERY 8 HOURS PRN
Status: DISCONTINUED | OUTPATIENT
Start: 2025-04-16 | End: 2025-04-20 | Stop reason: HOSPADM

## 2025-04-16 RX ORDER — CALCIUM CARBONATE 500 MG/1
1 TABLET, CHEWABLE ORAL EVERY 4 HOURS PRN
Status: DISCONTINUED | OUTPATIENT
Start: 2025-04-16 | End: 2025-04-20 | Stop reason: HOSPADM

## 2025-04-16 RX ORDER — OXYCODONE HYDROCHLORIDE 10 MG/1
10 TABLET ORAL EVERY 4 HOURS PRN
Status: DISCONTINUED | OUTPATIENT
Start: 2025-04-16 | End: 2025-04-20 | Stop reason: HOSPADM

## 2025-04-16 RX ORDER — METHYLERGONOVINE MALEATE 0.2 MG/ML
200 INJECTION INTRAVENOUS ONCE AS NEEDED
Status: DISCONTINUED | OUTPATIENT
Start: 2025-04-16 | End: 2025-04-20 | Stop reason: HOSPADM

## 2025-04-16 RX ORDER — LIDOCAINE HYDROCHLORIDE AND EPINEPHRINE 15; 5 MG/ML; UG/ML
INJECTION, SOLUTION EPIDURAL AS NEEDED
Status: DISCONTINUED | OUTPATIENT
Start: 2025-04-16 | End: 2025-04-16 | Stop reason: SURG

## 2025-04-16 RX ORDER — PRENATAL VIT/IRON FUM/FOLIC AC 27MG-0.8MG
1 TABLET ORAL DAILY
Status: DISCONTINUED | OUTPATIENT
Start: 2025-04-16 | End: 2025-04-20 | Stop reason: HOSPADM

## 2025-04-16 RX ORDER — ALUMINA, MAGNESIA, AND SIMETHICONE 2400; 2400; 240 MG/30ML; MG/30ML; MG/30ML
15 SUSPENSION ORAL EVERY 4 HOURS PRN
Status: DISCONTINUED | OUTPATIENT
Start: 2025-04-16 | End: 2025-04-20 | Stop reason: HOSPADM

## 2025-04-16 RX ADMIN — LIDOCAINE HYDROCHLORIDE,EPINEPHRINE BITARTRATE 4 ML: 20; .005 INJECTION, SOLUTION EPIDURAL; INFILTRATION; INTRACAUDAL; PERINEURAL at 17:37

## 2025-04-16 RX ADMIN — SODIUM CHLORIDE, POTASSIUM CHLORIDE, SODIUM LACTATE AND CALCIUM CHLORIDE: 600; 310; 30; 20 INJECTION, SOLUTION INTRAVENOUS at 16:25

## 2025-04-16 RX ADMIN — TERBUTALINE SULFATE 0.25 MG: 1 INJECTION, SOLUTION SUBCUTANEOUS at 17:16

## 2025-04-16 RX ADMIN — FAMOTIDINE 20 MG: 10 INJECTION INTRAVENOUS at 17:40

## 2025-04-16 RX ADMIN — EPHEDRINE SULFATE 10 MG: 5 INJECTION INTRAVENOUS at 17:09

## 2025-04-16 RX ADMIN — Medication 200 MCG: at 17:59

## 2025-04-16 RX ADMIN — SODIUM CHLORIDE, POTASSIUM CHLORIDE, SODIUM LACTATE AND CALCIUM CHLORIDE: 600; 310; 30; 20 INJECTION, SOLUTION INTRAVENOUS at 17:08

## 2025-04-16 RX ADMIN — Medication 300 MCG: at 17:54

## 2025-04-16 RX ADMIN — KETOROLAC TROMETHAMINE 30 MG: 30 INJECTION, SOLUTION INTRAMUSCULAR; INTRAVENOUS at 19:15

## 2025-04-16 RX ADMIN — METHYLERGONOVINE MALEATE 200 MCG: 0.2 INJECTION, SOLUTION INTRAMUSCULAR; INTRAVENOUS at 17:50

## 2025-04-16 RX ADMIN — MORPHINE SULFATE 1 MG: 0.5 INJECTION, SOLUTION EPIDURAL; INTRATHECAL; INTRAVENOUS at 17:55

## 2025-04-16 RX ADMIN — LIDOCAINE HYDROCHLORIDE AND EPINEPHRINE 3 ML: 15; 5 INJECTION, SOLUTION EPIDURAL at 16:47

## 2025-04-16 RX ADMIN — LIDOCAINE HYDROCHLORIDE AND EPINEPHRINE 2 ML: 15; 5 INJECTION, SOLUTION EPIDURAL at 16:48

## 2025-04-16 RX ADMIN — METOCLOPRAMIDE 10 MG: 5 INJECTION, SOLUTION INTRAMUSCULAR; INTRAVENOUS at 17:40

## 2025-04-16 RX ADMIN — ACETAMINOPHEN 1000 MG: 500 TABLET ORAL at 20:53

## 2025-04-16 RX ADMIN — MORPHINE SULFATE 4 MG: 0.5 INJECTION, SOLUTION EPIDURAL; INTRATHECAL; INTRAVENOUS at 17:48

## 2025-04-16 RX ADMIN — CEFAZOLIN SODIUM 2 G: 2 INJECTION, SOLUTION INTRAVENOUS at 17:37

## 2025-04-16 RX ADMIN — Medication 500 ML: at 17:46

## 2025-04-16 RX ADMIN — SODIUM CHLORIDE, POTASSIUM CHLORIDE, SODIUM LACTATE AND CALCIUM CHLORIDE: 600; 310; 30; 20 INJECTION, SOLUTION INTRAVENOUS at 17:34

## 2025-04-16 RX ADMIN — LIDOCAINE HYDROCHLORIDE,EPINEPHRINE BITARTRATE 5 ML: 20; .005 INJECTION, SOLUTION EPIDURAL; INFILTRATION; INTRACAUDAL; PERINEURAL at 17:58

## 2025-04-16 RX ADMIN — FENTANYL CITRATE 100 MCG: 50 INJECTION, SOLUTION INTRAMUSCULAR; INTRAVENOUS at 18:00

## 2025-04-16 RX ADMIN — ONDANSETRON 4 MG: 2 INJECTION INTRAMUSCULAR; INTRAVENOUS at 17:40

## 2025-04-16 RX ADMIN — LIDOCAINE HYDROCHLORIDE,EPINEPHRINE BITARTRATE 11 ML: 20; .005 INJECTION, SOLUTION EPIDURAL; INFILTRATION; INTRACAUDAL; PERINEURAL at 16:51

## 2025-04-16 RX ADMIN — EPHEDRINE SULFATE 10 MG: 5 INJECTION INTRAVENOUS at 16:57

## 2025-04-16 NOTE — TELEPHONE ENCOUNTER
Was able to discuss with Dr. Guallpa. She states if patient wants to see MAYLIN today; she will still see patient tomorrow.   Informed patient. She would like to see MAYLIN this afternoon, as long as she can still see Dr. Guallpa tomorrow. Appointment scheduled.

## 2025-04-16 NOTE — OP NOTE
Section Procedure Note    Indications: Breech presentation    Pre-operative Diagnosis: 1: 37w6d                Breech presentation                Post-operative Diagnosis: 1:  Same.      Procedures:  Procedure(s):   SECTION PRIMARYLTUI    Surgeon:  Claudia Guallpa MD    Assistant:   Roc Chaudhari DO   was responsible for performing the following activities: Retraction, Irrigation, Suturing, and Closing and their skilled assistance was necessary for the success of this case.     Anesthesia:  Epidural    Estimated Blood Loss:  600 cc    Infant:            Gender: male infant    Weight: 3599 g (7 lb 15 oz)    Apgars: 8  @ 1 minute /     9  @ 5 minutes               Findings:       The infant was delivered from the Presentation/Position: Breech; Middle      The amnionic fluid was Meconium Present    Drains:  Ha catheter to straight drainage.                 Specimens: * No order type specified *           Antibiotics:  Cefazolin           Complications:  None; patient tolerated the procedure well.           Disposition: PACU - hemodynamically stable.           Condition: stable    Procedure Details   The patient was seen in the Holding Room. The risks, benefits, complications, treatment options, and expected outcomes were discussed with the patient.  The patient concurred with the proposed plan, giving informed consent.  The patient was taken to the Operating Room, identified as Kathy Anthony and the procedure verified as  Delivery. A Time Out was held and the above information confirmed.    After an adequate level of anesthesia was obtained, the patient was draped and prepped in the usual sterile manner. A Pfannenstiel incision was made and carried down through the subcutaneous tissue to the fascia. Fascial incision was made and extended transversely with the Adkins scissors. The fascia was  bluntly and sharply from the underlying rectus tissue superiorly and  inferiorly. The rectus muscles were divided sharply. The peritoneum was identified and entered. Peritoneal incision was extended longitudinally taking care not to injure the bladder and bowel.  The bladder flap was sharply and bluntly developed  A low transverse uterine incision was made with the scalpel.  Delivered from  Breech.  After the umbilical cord was milked, clamped and cut, cord blood was obtained for evaluation. The placenta was expressed intact and appeared normal.  The uterus was everted from the abdomen and curetted with a moist lap sponge x 2.    The uterine outline, tubes and ovaries appeared normal. The uterine incision was closed in two layers with a running continuous locking suture of #1 chromic. Hemostasis was obtained.  Irrigation was performed and counts were correct;  The uterus was replaced into the abdomen and the lateral gutters were irrigated.  The incision was reinspected and noted to be hemostatic.  Intercede was placed over the hysterotomy.  The peritoneum was closed with a running continuous suture of 2-0 Vicryl;  The rectus muscles reapproximated with interrupted sutures of 2-0 Vicryl. The fascia was then reapproximated with running sutures of 0 Vicryl. The subcutaneous layer was irrigated, noted to be hemostatic, and closed with 3-0 plain Gut.  The skin was reapproximated with staples.    Instrument, sponge, and needle counts were correct prior the abdominal closure and at the conclusion of the case. The patient went to the Recovery Room in stable condition with the manzo draining clear urine.       Claudia Guallpa MD      4/16/2025  19:42 EDT

## 2025-04-16 NOTE — TELEPHONE ENCOUNTER
Pt called and stated that since Monday she has been having some contractions that range from 6-15 minutes apart. Pt states she also has a lot of pelvic pressure and lower back pain. Pt states her contractions aren't as bad today, but she is still having the back  pain and pelvic pressure.pt denies bleeding or feeling like she is leaking fluid.  Pt would like to speak with a nurse

## 2025-04-16 NOTE — H&P
History and Physical  [facility]             Kathy Anthony is a 29 y.o. year old  with an Estimated Date of Delivery: 25 currently at 37w6 presenting with regular contractions and cervical dilation- breech presentation.    Prenatal care has been with Dr. Guallpa.  It has been significant for Breech in labor, prior 3 .       No Additional Complaints Reported    The following portions of the patient's history were reviewed and updated as appropriate:vital signs, allergies, current medications, past medical history, past social history, past surgical history, and problem list.    Review of Systems  A comprehensive review of systems was negative.     Objective     LMP 2024     Physical Exam    General:  well developed; well nourished  no acute distress  mentation appropriate           Abdomen: soft, non-tender; no masses  no umbilical or inguinal hernias are present  no hepato-splenomegaly       FHT's: reactive and category 1   Cervix: 5-6/90   [place]: Contraction are irregular     Lab Review   Labs: No data reviewed   Lab Results (last 24 hours)       ** No results found for the last 24 hours. **            Imaging   No data reviewed   Imaging Results (Most Recent)       None          Assessment & Plan     ASSESSMENT  IUP at 37w6d  Breech  GBS neg  Maternal obesity  RH pos     PLAN  Admit for attempt at ECV followed by C/S if not successful.         Claudia Guallpa MD  515:53 EDT

## 2025-04-16 NOTE — PROGRESS NOTES
CC- contractions and vaginal pressure       Kathy Anthony is a 29 y.o.  37w6d patient being seen today for contractions every 6-15 minutes since Monday. Patient states that she had to call in on Monday, worked yesterday, and called in today. Patient states that she was unable to time contractions yesterday while working. Patient states that she has had increased vaginal pressure since Monday. Patient also reports cramping. Patient denies urinary s/s and states that she has been asymptomatic with UTIs in the past.     Her prenatal care is complicated by (and status) :    Patient Active Problem List   Diagnosis    History of postpartum hemorrhage    Morbid obesity with BMI of 40.0-44.9, adult    Left upper quadrant abdominal pain affecting pregnancy in third trimester    Decreased fetal movement    Maternal care for breech presentation, single gestation     Ultrasound Today: No.  minutes 30+  non-stress test: NST: Reactive with occasional mild contractions  indication: Questionable Labor  category: Category I     ROS -   Patient Denies: Loss of Fluid, Vaginal Spotting, Vision Changes, Headaches, Nausea , Vomiting , Epigastric pain, and skin itching  Fetal Movement : normal  All other systems reviewed and are negative.       The additional following portions of the patient's history were reviewed and updated as appropriate: allergies, current medications, and problem list.    I have reviewed and agree with the HPI, ROS, and historical information as entered above. MAYLIN Ring      /82   Wt 102 kg (225 lb)   LMP 2024   BMI 41.15 kg/m²       EXAM:     Prenatal Vitals  BP: 118/82  Weight: 102 kg (225 lb)   Fetal Heart Rate: NST   Dilation/Effacement/Station  Dilation: 5  Effacement (%): 70  Station: -2     BBOW       Assessment and Plan    Problem List Items Addressed This Visit    None  Visit Diagnoses         Cramping affecting pregnancy, antepartum    -  Primary       Prenatal care, subsequent pregnancy in third trimester        Relevant Orders    POC Urinalysis Dipstick (Completed)      Abnormal urinalysis                Pregnancy at 37w6d  Fetal status reassuring.   NST reactive today.   Advanced dilation with bulging bag. Pt is breech. Discussed with Dr. Guallpa, will send downstairs to L&D, may be able to do ECV to avoid c/s. Pt will be wheeled downstairs.     Rossi Hawkins, APRJAGUAR  04/16/2025

## 2025-04-16 NOTE — ANESTHESIA PROCEDURE NOTES
Labor Epidural      Patient reassessed immediately prior to procedure    Patient location during procedure: OB  Performed By  Anesthesiologist: Rick Beltre DO  Preanesthetic Checklist  Completed: patient identified, IV checked, site marked, risks and benefits discussed, surgical consent, monitors and equipment checked, pre-op evaluation and timeout performed  Prep:  Pt Position:sitting  Sterile Tech:gloves, mask, sterile barrier and cap  Prep:chlorhexidine gluconate and isopropyl alcohol  Monitoring:blood pressure monitoring and continuous pulse oximetry  Epidural Block Procedure:  Approach:midline  Guidance:landmark technique and palpation technique  Location:L2-L3  Needle Type:Tuohy  Needle Gauge:17 G  Loss of Resistance Medium: air  Loss of Resistance: 6cm  Cath Depth at skin:12 cm  Paresthesia: none  Aspiration:negative  Test Dose:negative  Number of Attempts: 1  Post Assessment:  Dressing:secured with tape and occlusive dressing applied (Tegaderm Placed)  Pt Tolerance:patient tolerated the procedure well with no apparent complications  Complications:no

## 2025-04-16 NOTE — PROCEDURES
External cephalic version    Patient presents with known breech presentation and advanced cervical dilation.  After discussing the option of external cephalic version, the patient and her  consent to proceed given she has had 3 prior ..  The nature, risks, benefits and potential complications have been reviewed.  Prior to procedure, an NST was performed which was reactive without decelerations.  She received an epidural given she would be induced or have a c section dependent on results of the version.0.2 mg of subcutaneous Brethine was administered.  Bedside ultrasound was performed which confirmed the breech in the maternal left lower quadrant in the fetal head in the maternal right upper quadrant.  A forward somersault maneuver was then performed rotating the fetal head to the pelvis after the breech had been elevated from the maternal pelvis.  This was unsuccessful x 2.  Attemt to elevated the fetal breech internally was helpful however the fetal head would not descend and ultimately the membranes ruptured, meconium was noted and despite one last effor the baby would not vert   Maternal blood type is RH positive.  RhoGam will not be necessary.  We will plan to proceed with emergent  section.

## 2025-04-16 NOTE — ANESTHESIA PREPROCEDURE EVALUATION
Anesthesia Evaluation     Patient summary reviewed and Nursing notes reviewed                Airway   Mallampati: II  TM distance: >3 FB  Neck ROM: full  No difficulty expected  Dental      Pulmonary - negative pulmonary ROS   Cardiovascular - negative cardio ROS        Neuro/Psych  (+) psychiatric history Anxiety and Depression  GI/Hepatic/Renal/Endo    (+) morbid obesity, GERD    Musculoskeletal (-) negative ROS    Abdominal    Substance History - negative use     OB/GYN    (+) Pregnant        Other                    Anesthesia Plan    ASA 3     epidural       Anesthetic plan, risks, benefits, and alternatives have been provided, discussed and informed consent has been obtained with: patient.    Use of blood products discussed with patient .      CODE STATUS:

## 2025-04-16 NOTE — TELEPHONE ENCOUNTER
Patient of Dr. Guallpa;  @ 37w 6d. LOV 04/10/25; NOV is tomorrow.   Returned patient's call.   Reports having irregular contractions for past 2 days; mild but not increasing in intensity. Have been every 6-20 minutes while awake; sometimes they wake her during the night. Having constant mild-moderate low back pain. Also having a lot of pelvic pressure.   Denies any dysuria or other concerns for UTI.   Reports normal fetal movement.  Denies any bleeding or leaking fluid. States she does have some urinary incontinence when she coughs.   States she has tried heating pad to back and warm shower with no relief. She does wear a pregnancy support belt.   Discussed that she can monitor and keep appointment tomorrow as scheduled or we can see her today if she would like. States she will monitor and keep appointment tomorrow.    Discussed increasing hydration and rest; monitor and call or go to L&D for SROM, bleeding, regular contractions, decreased fetal movement, or other worsening symptoms. Patient v/u and agreed.

## 2025-04-16 NOTE — ANESTHESIA POSTPROCEDURE EVALUATION
Patient: Kathy Anthony    Procedure Summary       Date: 25 Room / Location: [facility] LABOR DELIVERY 2   [facility] LABOR DELIVERY    Anesthesia Start:  Anesthesia Stop:     Procedure:  SECTION PRIMARY (Abdomen) Diagnosis:     Surgeons: Claudia Guallpa MD Provider: Rick Beltre DO    Anesthesia Type: epidural ASA Status: 3            Anesthesia Type: epidural    Vitals  Vitals Value Taken Time   /84 25 17:30   Temp 97.5 F    Pulse 137 25 17:34   Resp 16    SpO2 98 % 25 17:34           Post Anesthesia Care and Evaluation    Patient location during evaluation: bedside  Patient participation: complete - patient participated  Level of consciousness: awake  Pain score: 0  Pain management: satisfactory to patient    Airway patency: patent  Anesthetic complications: No anesthetic complications  PONV Status: none  Cardiovascular status: acceptable and hemodynamically stable  Respiratory status: acceptable  Hydration status: acceptable

## 2025-04-17 LAB
BASOPHILS # BLD AUTO: 0.03 10*3/MM3 (ref 0–0.2)
BASOPHILS NFR BLD AUTO: 0.3 % (ref 0–1.5)
DEPRECATED RDW RBC AUTO: 49.1 FL (ref 37–54)
EOSINOPHIL # BLD AUTO: 0.03 10*3/MM3 (ref 0–0.4)
EOSINOPHIL NFR BLD AUTO: 0.3 % (ref 0.3–6.2)
ERYTHROCYTE [DISTWIDTH] IN BLOOD BY AUTOMATED COUNT: 14.5 % (ref 12.3–15.4)
HCT VFR BLD AUTO: 35.1 % (ref 34–46.6)
HGB BLD-MCNC: 11.5 G/DL (ref 12–15.9)
IMM GRANULOCYTES # BLD AUTO: 0.07 10*3/MM3 (ref 0–0.05)
IMM GRANULOCYTES NFR BLD AUTO: 0.6 % (ref 0–0.5)
LYMPHOCYTES # BLD AUTO: 1.99 10*3/MM3 (ref 0.7–3.1)
LYMPHOCYTES NFR BLD AUTO: 16.7 % (ref 19.6–45.3)
MCH RBC QN AUTO: 30.7 PG (ref 26.6–33)
MCHC RBC AUTO-ENTMCNC: 32.8 G/DL (ref 31.5–35.7)
MCV RBC AUTO: 93.9 FL (ref 79–97)
MONOCYTES # BLD AUTO: 0.85 10*3/MM3 (ref 0.1–0.9)
MONOCYTES NFR BLD AUTO: 7.1 % (ref 5–12)
NEUTROPHILS NFR BLD AUTO: 75 % (ref 42.7–76)
NEUTROPHILS NFR BLD AUTO: 8.94 10*3/MM3 (ref 1.7–7)
NRBC BLD AUTO-RTO: 0 /100 WBC (ref 0–0.2)
PLATELET # BLD AUTO: 162 10*3/MM3 (ref 140–450)
PMV BLD AUTO: 11.4 FL (ref 6–12)
RBC # BLD AUTO: 3.74 10*6/MM3 (ref 3.77–5.28)
TREPONEMA PALLIDUM IGG+IGM AB [PRESENCE] IN SERUM OR PLASMA BY IMMUNOASSAY: NORMAL
WBC NRBC COR # BLD AUTO: 11.91 10*3/MM3 (ref 3.4–10.8)

## 2025-04-17 PROCEDURE — 0503F POSTPARTUM CARE VISIT: CPT | Performed by: ADVANCED PRACTICE MIDWIFE

## 2025-04-17 PROCEDURE — 25810000003 LACTATED RINGERS SOLUTION: Performed by: OBSTETRICS & GYNECOLOGY

## 2025-04-17 PROCEDURE — 25010000002 KETOROLAC TROMETHAMINE PER 15 MG: Performed by: OBSTETRICS & GYNECOLOGY

## 2025-04-17 PROCEDURE — 85025 COMPLETE CBC W/AUTO DIFF WBC: CPT | Performed by: OBSTETRICS & GYNECOLOGY

## 2025-04-17 RX ORDER — GUAIFENESIN 200 MG/10ML
200 LIQUID ORAL EVERY 4 HOURS PRN
Status: DISCONTINUED | OUTPATIENT
Start: 2025-04-17 | End: 2025-04-20 | Stop reason: HOSPADM

## 2025-04-17 RX ADMIN — ACETAMINOPHEN 650 MG: 325 TABLET, FILM COATED ORAL at 20:40

## 2025-04-17 RX ADMIN — KETOROLAC TROMETHAMINE 15 MG: 15 INJECTION, SOLUTION INTRAMUSCULAR; INTRAVENOUS at 00:20

## 2025-04-17 RX ADMIN — DOCUSATE SODIUM 100 MG: 100 CAPSULE, LIQUID FILLED ORAL at 07:56

## 2025-04-17 RX ADMIN — KETOROLAC TROMETHAMINE 15 MG: 15 INJECTION, SOLUTION INTRAMUSCULAR; INTRAVENOUS at 11:45

## 2025-04-17 RX ADMIN — ACETAMINOPHEN 1000 MG: 500 TABLET ORAL at 07:56

## 2025-04-17 RX ADMIN — KETOROLAC TROMETHAMINE 15 MG: 15 INJECTION, SOLUTION INTRAMUSCULAR; INTRAVENOUS at 06:07

## 2025-04-17 RX ADMIN — Medication 1 APPLICATION: at 02:50

## 2025-04-17 RX ADMIN — PRENATAL VITAMINS-IRON FUMARATE 27 MG IRON-FOLIC ACID 0.8 MG TABLET 1 TABLET: at 07:56

## 2025-04-17 RX ADMIN — GUAIFENESIN 200 MG: 200 SOLUTION ORAL at 20:58

## 2025-04-17 RX ADMIN — ACETAMINOPHEN 1000 MG: 500 TABLET ORAL at 02:11

## 2025-04-17 RX ADMIN — KETOROLAC TROMETHAMINE 15 MG: 15 INJECTION, SOLUTION INTRAMUSCULAR; INTRAVENOUS at 18:16

## 2025-04-17 RX ADMIN — ACETAMINOPHEN 1000 MG: 500 TABLET ORAL at 14:28

## 2025-04-17 RX ADMIN — SODIUM CHLORIDE, POTASSIUM CHLORIDE, SODIUM LACTATE AND CALCIUM CHLORIDE 1000 ML: 600; 310; 30; 20 INJECTION, SOLUTION INTRAVENOUS at 08:28

## 2025-04-17 NOTE — LACTATION NOTE
04/17/25 1030   Maternal Information   Date of Referral 04/17/25   Person Making Referral lactation consultant   Maternal Reason for Referral previous breastfeeding issues  (oversupply, multiple bouts of mastitis, EP)   Infant Reason for Referral hypoglycemia;tight frenulum  (low BS with multiple doses of gel and supplementing with formula)   Maternal Assessment   Breast Shape Bilateral:;round   Breast Density Bilateral:;soft   Nipples Bilateral:;short   Left Nipple Symptoms intact;tender   Right Nipple Symptoms intact;nontender   Maternal Infant Feeding   Maternal Emotional State receptive   Infant Positioning clutch/football   Signs of Milk Transfer deep jaw excursions noted   Pain with Feeding no   Comfort Measures Before/During Feeding infant position adjusted;maternal position adjusted;other (see comments)  (20mm NS)   Comfort Measures Following Feeding air-drying encouraged   Latch Assistance minimal assistance;verbal guidance offered   Milk Expression/Equipment   Breast Pump Type double electric, personal  (spectra; enc to have  bring to hospital)   Breast Pump Flange Size 21 mm   Equipment for Home Use breast pump ordered through insurance   Breast Pumping   Breast Pumping Interventions post-feed pumping encouraged  (d/t infant hypoglycemia and supplementation)   Lactation Referrals   Lactation Referrals outpatient lactation program   Outpatient Lactation Program Lactation Follow-up Date/Time as needed     Courtesy visit for newly postpartum couplet. MOB reports she did not breast feed first two children, but she EP 1yr for 3rd child due to strong let down. Reports over supply (~80oz/day) and multiple bouts of mastitis. Reports 2nd and 3rd children had oral restrictions that were released in the hospital at delivery (both born in [place]). This baby noted to have thin frenulum that is attached anteriorly. Baby had very long nursing sessions overnight, but low blood sugars this morning.  "Reviewed with MOB how oral restrictions can \"trick\" us as the latch looks ok from the outside, but baby is not actually transferring efficiently. Reviewed things to watch for - like pain with latch, continued low sugars, excess weight loss, etc. Reports her older children go to Dr Acharya's office for their routine dental care, and she feels comfortable reaching out to them. Enc outpatient lactation and/or SLP follow up. MOB declines SLP consult in the hospital.   MOB requests nipple shield for right breast, as she feels this nipple is shorter and he is struggling to latch on right, provided. Infant latched well with shield for about 5 min.   Discussed pumping after feedings d/t supplementation and low blood sugar. WEST is agreeable, but d/t hx of oversupply, plans to only pump for short time (~10min/session). LATRELL is agreeable with this plan.   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 Educational handout provided and reviewed. Encouraged to call as needs arise.     "

## 2025-04-17 NOTE — ANESTHESIA POSTPROCEDURE EVALUATION
Patient: Kathy Anthony    Procedure Summary       Date: 25 Room / Location: [facility] LABOR DELIVERY   [facility] LABOR DELIVERY    Anesthesia Start:  Anesthesia Stop:     Procedure:  SECTION PRIMARY (Abdomen) Diagnosis:     Surgeons: Claudia Guallpa MD Provider: Rick Beltre DO    Anesthesia Type: epidural ASA Status: 3            Anesthesia Type: epidural    Vitals  Vitals Value Taken Time   /80 25 08:00   Temp 98.2 °F (36.8 °C) 25 08:00   Pulse 82 25 08:00   Resp 18 25 08:00   SpO2 99 % 25 19:32   Vitals shown include unfiled device data.        Post Anesthesia Care and Evaluation    Patient location during evaluation: bedside  Patient participation: complete - patient participated  Level of consciousness: awake and alert  Pain management: adequate    Airway patency: patent  Anesthetic complications: No anesthetic complications    Cardiovascular status: acceptable  Respiratory status: acceptable  Hydration status: acceptable  Post Neuraxial Block status: Motor and sensory function returned to baseline and No signs or symptoms of PDPH

## 2025-04-17 NOTE — PROGRESS NOTES
Postpartum Progress Note    Patient name: Kathy Anthony  YOB: 1995   MRN: [id number]  Referring Provider: Claudia Guallpa MD  Admission Date: 2025  Date of Service: 2025    ID: 29 y.o.     Diagnosis:   S/p  delivery 1 Day Post-Op     Breech presentation       Subjective:      No complaints.  Moderate lochia.  Patient is currently receiving an IVF bolus due to decreased urine output overnight. Ha cath still in place. She has not been out of bed yet but will be after the bolus is done.   Pain is controlled with medications.   The patient is currently breastfeeding.       Objective:      Vital signs:  Vital Signs Range for the last 24 hours  Temperature: Temp:  [97.5 °F (36.4 °C)-98.3 °F (36.8 °C)] 98.2 °F (36.8 °C)   Temp Source: Temp src: Oral   BP: BP: (105-139)/() 132/78   Pulse: Heart Rate:  [] 75   Respirations: Resp:  [16-18] 18   Weight:       General: Alert & oriented x4, in no apparent distress  Abdomen: soft, nontender  Uterus: firm, nontender  Incision: clean, dry, intact, OR dressing clean  Extremities: nontender; no edema. SCD's in place.     Labs:  Lab Results   Component Value Date    WBC 11.91 (H) 2025    HGB 11.5 (L) 2025    HCT 35.1 2025    MCV 93.9 2025     2025     Results from last 7 days   Lab Units 25   ABO TYPING  A   RH TYPING  Positive     External Prenatal Results       Pregnancy Outside Results - Transcribed From Office Records - See Scanned Records For Details       Test Value Date Time    ABO  A  25    Rh  Positive  25 191    Antibody Screen  Negative  25 1607       Negative  25 1138       Negative  10/02/24 1300    Varicella IgG  595 index 24 1255    Rubella  <0.90 index 10/02/24 1300       1.01 index 24 1255    Hgb  11.5 g/dL 25 0422       13.7 g/dL 25 1615       12.2 g/dL 25 2041       12.7 g/dL 25  1602       12.2 g/dL 02/06/25 1138       13.2 g/dL 11/05/24 1148       13.1 g/dL 10/02/24 1300    Hct  35.1 % 04/17/25 0422       39.9 % 04/16/25 1615       35.4 % 03/18/25 2041       38.9 % 03/06/25 1602       35.3 % 02/06/25 1138       39.7 % 11/05/24 1148       42.2 % 10/02/24 1300    HgB A1c        1h GTT  194 mg/dL 02/06/25 1138    3h GTT Fasting  79 mg/dL 02/10/25 1442    3h GTT 1 hour  203 mg/dL 02/10/25 1442    3h GTT 2 hour  140 mg/dL 02/10/25 1442    3h GTT 3 hour   85 mg/dL 02/10/25 1442    Gonorrhea (discrete)  Negative  10/02/24 1300    Chlamydia (discrete)  Negative  10/02/24 1300    RPR  Non Reactive  02/06/25 1138       Non Reactive  10/02/24 1300    Syphils cascade: TP-Ab (FTA)  Non-Reactive  04/16/25 1615    TP-Ab  Non-Reactive  04/16/25 1615    TP-Ab (EIA)       TPPA       HBsAg  Negative  10/02/24 1300    Herpes Simplex Virus PCR       Herpes Simplex VIrus Culture       HIV  Non Reactive  10/02/24 1300    Hep C RNA Quant PCR       Hep C Antibody  Non Reactive  10/02/24 1300    AFP       NIPT       Cystic Fibrosis (Jimbo)       Cystic Fibroisis        Spinal Muscular atrophy       Fragile X       Group B Strep  No Group B Streptococcus isolated  04/03/25 1812    GBS Susceptibility to Clindamycin       GBS Susceptibility to Erythromycin       Fetal Fibronectin       Genetic Testing, Maternal Blood                 Drug Screening       Test Value Date Time    Urine Drug Screen       Amphetamine Screen  Negative ng/mL 10/02/24 1300    Barbiturate Screen  Negative ng/mL 10/02/24 1300    Benzodiazepine Screen  Negative ng/mL 10/02/24 1300    Methadone Screen  Negative ng/mL 10/02/24 1300    Phencyclidine Screen  Negative ng/mL 10/02/24 1300    Opiates Screen       THC Screen       Cocaine Screen       Propoxyphene Screen  Negative ng/mL 10/02/24 1300    Buprenorphine Screen       Methamphetamine Screen       Oxycodone Screen       Tricyclic Antidepressants Screen                 Legend    ^:  Historical                            Assessment/Plan:      1 Day Post-Op s/p Procedure(s):   SECTION PRIMARY  1. S/p  delivery: Continue postoperative care.  Doing well.  2. Infant feeding: Supportive care.  The patient is currently breastfeeding.  3. Discontinue manzo catheter when UOP is adequate.   4. Repeat CBC and PEP's in AM.

## 2025-04-18 LAB
ALP SERPL-CCNC: 106 U/L (ref 39–117)
ALP SERPL-CCNC: 96 U/L (ref 39–117)
ALT SERPL W P-5'-P-CCNC: 10 U/L (ref 1–33)
ALT SERPL W P-5'-P-CCNC: 10 U/L (ref 1–33)
AST SERPL-CCNC: 24 U/L (ref 1–32)
AST SERPL-CCNC: 24 U/L (ref 1–32)
BASOPHILS # BLD AUTO: 0.02 10*3/MM3 (ref 0–0.2)
BASOPHILS NFR BLD AUTO: 0.2 % (ref 0–1.5)
BILIRUB SERPL-MCNC: <0.2 MG/DL (ref 0–1.2)
BILIRUB SERPL-MCNC: <0.2 MG/DL (ref 0–1.2)
CREAT SERPL-MCNC: 0.59 MG/DL (ref 0.57–1)
CREAT SERPL-MCNC: 0.73 MG/DL (ref 0.57–1)
DEPRECATED RDW RBC AUTO: 51.3 FL (ref 37–54)
EOSINOPHIL # BLD AUTO: 0.15 10*3/MM3 (ref 0–0.4)
EOSINOPHIL NFR BLD AUTO: 1.5 % (ref 0.3–6.2)
ERYTHROCYTE [DISTWIDTH] IN BLOOD BY AUTOMATED COUNT: 14.8 % (ref 12.3–15.4)
HCT VFR BLD AUTO: 31.1 % (ref 34–46.6)
HGB BLD-MCNC: 10.4 G/DL (ref 12–15.9)
IMM GRANULOCYTES # BLD AUTO: 0.12 10*3/MM3 (ref 0–0.05)
IMM GRANULOCYTES NFR BLD AUTO: 1.2 % (ref 0–0.5)
LDH SERPL-CCNC: 204 U/L (ref 135–214)
LDH SERPL-CCNC: 290 U/L (ref 135–214)
LYMPHOCYTES # BLD AUTO: 2.4 10*3/MM3 (ref 0.7–3.1)
LYMPHOCYTES NFR BLD AUTO: 24.6 % (ref 19.6–45.3)
MCH RBC QN AUTO: 31.5 PG (ref 26.6–33)
MCHC RBC AUTO-ENTMCNC: 33.4 G/DL (ref 31.5–35.7)
MCV RBC AUTO: 94.2 FL (ref 79–97)
MONOCYTES # BLD AUTO: 0.76 10*3/MM3 (ref 0.1–0.9)
MONOCYTES NFR BLD AUTO: 7.8 % (ref 5–12)
NEUTROPHILS NFR BLD AUTO: 6.3 10*3/MM3 (ref 1.7–7)
NEUTROPHILS NFR BLD AUTO: 64.7 % (ref 42.7–76)
NRBC BLD AUTO-RTO: 0 /100 WBC (ref 0–0.2)
PLATELET # BLD AUTO: 154 10*3/MM3 (ref 140–450)
PMV BLD AUTO: 11.4 FL (ref 6–12)
RBC # BLD AUTO: 3.3 10*6/MM3 (ref 3.77–5.28)
URATE SERPL-MCNC: 7.1 MG/DL (ref 2.4–5.7)
URATE SERPL-MCNC: 7.3 MG/DL (ref 2.4–5.7)
WBC NRBC COR # BLD AUTO: 9.75 10*3/MM3 (ref 3.4–10.8)

## 2025-04-18 PROCEDURE — 84550 ASSAY OF BLOOD/URIC ACID: CPT | Performed by: OBSTETRICS & GYNECOLOGY

## 2025-04-18 PROCEDURE — 85025 COMPLETE CBC W/AUTO DIFF WBC: CPT | Performed by: ADVANCED PRACTICE MIDWIFE

## 2025-04-18 PROCEDURE — 84075 ASSAY ALKALINE PHOSPHATASE: CPT | Performed by: ADVANCED PRACTICE MIDWIFE

## 2025-04-18 PROCEDURE — 84075 ASSAY ALKALINE PHOSPHATASE: CPT | Performed by: OBSTETRICS & GYNECOLOGY

## 2025-04-18 PROCEDURE — 84460 ALANINE AMINO (ALT) (SGPT): CPT | Performed by: OBSTETRICS & GYNECOLOGY

## 2025-04-18 PROCEDURE — 82565 ASSAY OF CREATININE: CPT | Performed by: ADVANCED PRACTICE MIDWIFE

## 2025-04-18 PROCEDURE — 82565 ASSAY OF CREATININE: CPT | Performed by: OBSTETRICS & GYNECOLOGY

## 2025-04-18 PROCEDURE — 84460 ALANINE AMINO (ALT) (SGPT): CPT | Performed by: ADVANCED PRACTICE MIDWIFE

## 2025-04-18 PROCEDURE — 82247 BILIRUBIN TOTAL: CPT | Performed by: OBSTETRICS & GYNECOLOGY

## 2025-04-18 PROCEDURE — 84450 TRANSFERASE (AST) (SGOT): CPT | Performed by: OBSTETRICS & GYNECOLOGY

## 2025-04-18 PROCEDURE — 83615 LACTATE (LD) (LDH) ENZYME: CPT | Performed by: ADVANCED PRACTICE MIDWIFE

## 2025-04-18 PROCEDURE — 83615 LACTATE (LD) (LDH) ENZYME: CPT | Performed by: OBSTETRICS & GYNECOLOGY

## 2025-04-18 PROCEDURE — 84550 ASSAY OF BLOOD/URIC ACID: CPT | Performed by: ADVANCED PRACTICE MIDWIFE

## 2025-04-18 PROCEDURE — 82247 BILIRUBIN TOTAL: CPT | Performed by: ADVANCED PRACTICE MIDWIFE

## 2025-04-18 PROCEDURE — 84450 TRANSFERASE (AST) (SGOT): CPT | Performed by: ADVANCED PRACTICE MIDWIFE

## 2025-04-18 RX ORDER — ESCITALOPRAM OXALATE 10 MG/1
10 TABLET ORAL DAILY
Status: DISCONTINUED | OUTPATIENT
Start: 2025-04-18 | End: 2025-04-20 | Stop reason: HOSPADM

## 2025-04-18 RX ADMIN — OXYCODONE HYDROCHLORIDE 10 MG: 10 TABLET ORAL at 21:45

## 2025-04-18 RX ADMIN — ACETAMINOPHEN 650 MG: 325 TABLET, FILM COATED ORAL at 03:04

## 2025-04-18 RX ADMIN — IBUPROFEN 600 MG: 600 TABLET, FILM COATED ORAL at 00:17

## 2025-04-18 RX ADMIN — GUAIFENESIN 200 MG: 200 SOLUTION ORAL at 11:37

## 2025-04-18 RX ADMIN — DOCUSATE SODIUM 100 MG: 100 CAPSULE, LIQUID FILLED ORAL at 08:40

## 2025-04-18 RX ADMIN — OXYCODONE HYDROCHLORIDE 5 MG: 5 TABLET ORAL at 00:17

## 2025-04-18 RX ADMIN — ESCITALOPRAM OXALATE 10 MG: 10 TABLET ORAL at 11:37

## 2025-04-18 RX ADMIN — SIMETHICONE 80 MG: 80 TABLET, CHEWABLE ORAL at 18:12

## 2025-04-18 RX ADMIN — PRENATAL VITAMINS-IRON FUMARATE 27 MG IRON-FOLIC ACID 0.8 MG TABLET 1 TABLET: at 08:40

## 2025-04-18 RX ADMIN — ACETAMINOPHEN 650 MG: 325 TABLET, FILM COATED ORAL at 20:35

## 2025-04-18 RX ADMIN — OXYCODONE HYDROCHLORIDE 5 MG: 5 TABLET ORAL at 10:53

## 2025-04-18 RX ADMIN — OXYCODONE HYDROCHLORIDE 10 MG: 10 TABLET ORAL at 14:33

## 2025-04-18 RX ADMIN — IBUPROFEN 600 MG: 600 TABLET, FILM COATED ORAL at 18:10

## 2025-04-18 RX ADMIN — IBUPROFEN 600 MG: 600 TABLET, FILM COATED ORAL at 05:51

## 2025-04-18 RX ADMIN — ACETAMINOPHEN 650 MG: 325 TABLET, FILM COATED ORAL at 14:05

## 2025-04-18 RX ADMIN — IBUPROFEN 600 MG: 600 TABLET, FILM COATED ORAL at 11:37

## 2025-04-18 RX ADMIN — ACETAMINOPHEN 650 MG: 325 TABLET, FILM COATED ORAL at 08:40

## 2025-04-18 NOTE — PROGRESS NOTES
2025    Name:Kathy Anthony    MR#:[id number]     PROGRESS NOTE:  Post-Op 2 S/P    HD:2    Subjective   29 y.o. yo Female  s/p CS at 37w6d doing well. Pain well controlled. Tolerating regular diet and having flatus. Lochia normal.  She denies headache, visual changes.      Breech presentation       Objective    Vitals  Temp:  Temp:  [98.1 °F (36.7 °C)-98.7 °F (37.1 °C)] 98.1 °F (36.7 °C)  Temp src: Oral  BP:  BP: (128-140)/(67-84) 128/67  Pulse:  Heart Rate:  [68-77] 77  RR:   Resp:  [16-18] 16    General Awake, alert, no distress  Abdomen Soft, non-distended, fundus firm, below umbilicus, appropriately tender  Incision  Intact, no erythema or exudate  Extremities Calves NT bilaterally     I/O last 3 completed shifts:  In: -   Out: 1535 [Urine:1535]    LABS:   Lab Results   Component Value Date    WBC 9.75 2025    HGB 10.4 (L) 2025    HCT 31.1 (L) 2025    MCV 94.2 2025     2025       Infant: male      Assessment   1.  POD 2, doing well   2.  One mild BP last night; wnl this am; asymptomatic, PEP wnl this am   3.  MO--- Lovenox qd    Plan: Routine postoperative care.  Monitor BP.  Rev pp preeclampsia prec.      Active Problems:   None      Katrina Spears, MAYLIN  2025 08:46 EDT

## 2025-04-19 LAB
ALP SERPL-CCNC: 118 U/L (ref 39–117)
ALT SERPL W P-5'-P-CCNC: 14 U/L (ref 1–33)
AST SERPL-CCNC: 37 U/L (ref 1–32)
BASOPHILS # BLD AUTO: 0.05 10*3/MM3 (ref 0–0.2)
BASOPHILS NFR BLD AUTO: 0.5 % (ref 0–1.5)
BILIRUB SERPL-MCNC: 0.2 MG/DL (ref 0–1.2)
CREAT SERPL-MCNC: 0.71 MG/DL (ref 0.57–1)
DEPRECATED RDW RBC AUTO: 51.8 FL (ref 37–54)
EOSINOPHIL # BLD AUTO: 0.21 10*3/MM3 (ref 0–0.4)
EOSINOPHIL NFR BLD AUTO: 2.1 % (ref 0.3–6.2)
ERYTHROCYTE [DISTWIDTH] IN BLOOD BY AUTOMATED COUNT: 15.2 % (ref 12.3–15.4)
HCT VFR BLD AUTO: 39.3 % (ref 34–46.6)
HGB BLD-MCNC: 13 G/DL (ref 12–15.9)
IMM GRANULOCYTES # BLD AUTO: 0.14 10*3/MM3 (ref 0–0.05)
IMM GRANULOCYTES NFR BLD AUTO: 1.4 % (ref 0–0.5)
LDH SERPL-CCNC: 260 U/L (ref 135–214)
LYMPHOCYTES # BLD AUTO: 2.14 10*3/MM3 (ref 0.7–3.1)
LYMPHOCYTES NFR BLD AUTO: 21.2 % (ref 19.6–45.3)
MCH RBC QN AUTO: 31 PG (ref 26.6–33)
MCHC RBC AUTO-ENTMCNC: 33.1 G/DL (ref 31.5–35.7)
MCV RBC AUTO: 93.6 FL (ref 79–97)
MONOCYTES # BLD AUTO: 0.5 10*3/MM3 (ref 0.1–0.9)
MONOCYTES NFR BLD AUTO: 5 % (ref 5–12)
NEUTROPHILS NFR BLD AUTO: 69.8 % (ref 42.7–76)
NEUTROPHILS NFR BLD AUTO: 7.05 10*3/MM3 (ref 1.7–7)
NRBC BLD AUTO-RTO: 0.2 /100 WBC (ref 0–0.2)
PLATELET # BLD AUTO: 240 10*3/MM3 (ref 140–450)
PMV BLD AUTO: 10.5 FL (ref 6–12)
RBC # BLD AUTO: 4.2 10*6/MM3 (ref 3.77–5.28)
URATE SERPL-MCNC: 6.6 MG/DL (ref 2.4–5.7)
WBC NRBC COR # BLD AUTO: 10.09 10*3/MM3 (ref 3.4–10.8)

## 2025-04-19 PROCEDURE — 84075 ASSAY ALKALINE PHOSPHATASE: CPT | Performed by: NURSE PRACTITIONER

## 2025-04-19 PROCEDURE — 82565 ASSAY OF CREATININE: CPT | Performed by: NURSE PRACTITIONER

## 2025-04-19 PROCEDURE — 63710000001 ONDANSETRON ODT 4 MG TABLET DISPERSIBLE: Performed by: OBSTETRICS & GYNECOLOGY

## 2025-04-19 PROCEDURE — 84450 TRANSFERASE (AST) (SGOT): CPT | Performed by: NURSE PRACTITIONER

## 2025-04-19 PROCEDURE — 90707 MMR VACCINE SC: CPT | Performed by: NURSE PRACTITIONER

## 2025-04-19 PROCEDURE — 85025 COMPLETE CBC W/AUTO DIFF WBC: CPT | Performed by: NURSE PRACTITIONER

## 2025-04-19 PROCEDURE — 84460 ALANINE AMINO (ALT) (SGPT): CPT | Performed by: NURSE PRACTITIONER

## 2025-04-19 PROCEDURE — 83615 LACTATE (LD) (LDH) ENZYME: CPT | Performed by: NURSE PRACTITIONER

## 2025-04-19 PROCEDURE — 90471 IMMUNIZATION ADMIN: CPT | Performed by: NURSE PRACTITIONER

## 2025-04-19 PROCEDURE — 82247 BILIRUBIN TOTAL: CPT | Performed by: NURSE PRACTITIONER

## 2025-04-19 PROCEDURE — 84550 ASSAY OF BLOOD/URIC ACID: CPT | Performed by: NURSE PRACTITIONER

## 2025-04-19 PROCEDURE — 25010000002 MEASLES, MUMPS & RUBELLA VAC RECONSTITUTED SOLUTION: Performed by: NURSE PRACTITIONER

## 2025-04-19 RX ORDER — ESCITALOPRAM OXALATE 10 MG/1
10 TABLET ORAL DAILY
Qty: 30 TABLET | Refills: 1 | Status: SHIPPED | OUTPATIENT
Start: 2025-04-20 | End: 2025-04-20

## 2025-04-19 RX ORDER — OXYCODONE HYDROCHLORIDE 5 MG/1
5 TABLET ORAL EVERY 6 HOURS PRN
Qty: 12 TABLET | Refills: 0 | Status: SHIPPED | OUTPATIENT
Start: 2025-04-19 | End: 2025-04-20

## 2025-04-19 RX ORDER — LABETALOL 200 MG/1
200 TABLET, FILM COATED ORAL EVERY 8 HOURS SCHEDULED
Status: DISCONTINUED | OUTPATIENT
Start: 2025-04-19 | End: 2025-04-20 | Stop reason: HOSPADM

## 2025-04-19 RX ORDER — IBUPROFEN 600 MG/1
600 TABLET, FILM COATED ORAL EVERY 6 HOURS
Qty: 30 TABLET | Refills: 0 | Status: SHIPPED | OUTPATIENT
Start: 2025-04-19 | End: 2025-04-20

## 2025-04-19 RX ADMIN — IBUPROFEN 600 MG: 600 TABLET, FILM COATED ORAL at 00:09

## 2025-04-19 RX ADMIN — ACETAMINOPHEN 650 MG: 325 TABLET, FILM COATED ORAL at 03:02

## 2025-04-19 RX ADMIN — LABETALOL HYDROCHLORIDE 200 MG: 200 TABLET, FILM COATED ORAL at 21:10

## 2025-04-19 RX ADMIN — IBUPROFEN 600 MG: 600 TABLET, FILM COATED ORAL at 12:11

## 2025-04-19 RX ADMIN — IBUPROFEN 600 MG: 600 TABLET, FILM COATED ORAL at 05:44

## 2025-04-19 RX ADMIN — OXYCODONE HYDROCHLORIDE 10 MG: 10 TABLET ORAL at 08:27

## 2025-04-19 RX ADMIN — OXYCODONE HYDROCHLORIDE 10 MG: 10 TABLET ORAL at 12:10

## 2025-04-19 RX ADMIN — ACETAMINOPHEN 650 MG: 325 TABLET, FILM COATED ORAL at 21:10

## 2025-04-19 RX ADMIN — PRENATAL VITAMINS-IRON FUMARATE 27 MG IRON-FOLIC ACID 0.8 MG TABLET 1 TABLET: at 08:27

## 2025-04-19 RX ADMIN — DOCUSATE SODIUM 100 MG: 100 CAPSULE, LIQUID FILLED ORAL at 08:27

## 2025-04-19 RX ADMIN — ACETAMINOPHEN 650 MG: 325 TABLET, FILM COATED ORAL at 15:58

## 2025-04-19 RX ADMIN — DOCUSATE SODIUM 100 MG: 100 CAPSULE, LIQUID FILLED ORAL at 21:10

## 2025-04-19 RX ADMIN — LABETALOL HYDROCHLORIDE 200 MG: 200 TABLET, FILM COATED ORAL at 14:28

## 2025-04-19 RX ADMIN — ESCITALOPRAM OXALATE 10 MG: 10 TABLET ORAL at 08:27

## 2025-04-19 RX ADMIN — MEASLES, MUMPS, AND RUBELLA VIRUS VACCINE LIVE 0.5 ML: 1000; 12500; 1000 INJECTION, POWDER, LYOPHILIZED, FOR SUSPENSION SUBCUTANEOUS at 17:42

## 2025-04-19 RX ADMIN — IBUPROFEN 600 MG: 600 TABLET, FILM COATED ORAL at 18:34

## 2025-04-19 RX ADMIN — ACETAMINOPHEN 650 MG: 325 TABLET, FILM COATED ORAL at 08:27

## 2025-04-19 RX ADMIN — OXYCODONE HYDROCHLORIDE 10 MG: 10 TABLET ORAL at 03:02

## 2025-04-19 RX ADMIN — ONDANSETRON 4 MG: 4 TABLET, ORALLY DISINTEGRATING ORAL at 17:51

## 2025-04-19 NOTE — LACTATION NOTE
04/19/25 1157   Maternal Information   Date of Referral 04/19/25   Person Making Referral lactation consultant  (courtesy prior to discharge)   Maternal Reason for Referral other (see comments)  (mother reporting well; no needs/concerns at this time; to call lactation PRN and mentioned outpatient clinic if needed after discharge)

## 2025-04-19 NOTE — DISCHARGE SUMMARY
Discharge Summary    Date of Admission: 2025  Date of Discharge:  2025      Patient: Kathy Anthony      MR#:[id number]    Delivery Provider: Claudia Guallpa    Presenting Problem/History of Present Illness  Breech presentation [O32.1XX0]     Discharge Diagnosis: csection at 37w6d    Procedures:  , Low Transverse    2025   5:44 PM       Hospital Course  Patient is a 29 y.o. female  at 37w6d with breech presentation in labor status post csection without complication. Postpartum the patient did well. She remained afebrile.   She had complaints of anxiety and restarted Lexapro which was previously helpful.  She had a few very mildly elevated blood pressure readings.  She denies symptoms of preeclampsia and has no hx of elevated blood pressure.  Rev pp preeclampsia precautions and when to call.  She will return to office for BP check in one week and prn.  She was ready for discharge on postpartum day 3.     Infant:   male fetus 3599 g (7 lb 15 oz) with Apgar scores of 8 , 9  at five minutes.    Condition on Discharge:  Stable    Vital Signs  Temp:  [98 °F (36.7 °C)-98.5 °F (36.9 °C)] 98 °F (36.7 °C)  Heart Rate:  [68-77] 68  Resp:  [16-18] 16  BP: (136-161)/(72-94) 138/75    Lab Results   Component Value Date    WBC 9.75 2025    HGB 10.4 (L) 2025    HCT 31.1 (L) 2025    MCV 94.2 2025     2025       Discharge Disposition  Home or Self Care    Discharge Medications     Discharge Medications        New Medications        Instructions Start Date   escitalopram 10 MG tablet  Commonly known as: LEXAPRO   10 mg, Oral, Daily   Start Date: 2025     ibuprofen 600 MG tablet  Commonly known as: ADVIL,MOTRIN   600 mg, Oral, Every 6 Hours      oxyCODONE 5 MG immediate release tablet  Commonly known as: ROXICODONE   5 mg, Oral, Every 6 Hours PRN             Continue These Medications        Instructions Start Date   acetaminophen 650 MG 8 hr  tablet  Commonly known as: TYLENOL   650 mg, Every 8 Hours PRN      famotidine 40 MG tablet  Commonly known as: PEPCID   40 mg, Daily      guaiFENesin 600 MG 12 hr tablet  Commonly known as: MUCINEX   1,200 mg, 2 Times Daily      ondansetron ODT 4 MG disintegrating tablet  Commonly known as: ZOFRAN-ODT   Place 1 tablet on the tongue Every 8 (Eight) Hours for nausea and vomiting. Please use sparingly during pregnancy.      Prenatal 27-1 27-1 MG tablet tablet   1 tablet, Daily             Stop These Medications      cyclobenzaprine 10 MG tablet  Commonly known as: FLEXERIL     lidocaine 5 %  Commonly known as: Lidoderm     oseltamivir 75 MG capsule  Commonly known as: TAMIFLU                Follow-up Appointments  Future Appointments   Date Time Provider Department Center   4/24/2025  1:10 PM Claudia Guallpa MD [facility]   5/1/2025  1:10 PM Claudia Guallpa MD [facility]     Additional Instructions for the Follow-ups that You Need to Schedule       Discharge Follow-up with Specified Provider: eneida/np; 1 Week   As directed      To: eneida/np   Follow Up: 1 Week   Follow Up Details: bp check                Katrina Spears, MAYLIN  04/19/25  10:28 EDT  Csd

## 2025-04-19 NOTE — PROGRESS NOTES
2025    Name:Kathy Anthony    MR#:[id number]     PROGRESS NOTE:  Post-Op 3 S/P    HD:3    Subjective   29 y.o. yo Female  s/p CS at 37w6d doing well. Pain well controlled. Tolerating regular diet and having flatus. Lochia normal.  She denies headache, visual changes, sob, dizziness.      Breech presentation       Objective    Vitals  Temp:  Temp:  [98 °F (36.7 °C)-98.5 °F (36.9 °C)] 98 °F (36.7 °C)  Temp src: Oral  BP:  BP: (136-166)/(72-94) 166/80  Pulse:  Heart Rate:  [63-78] 63  RR:   Resp:  [16-18] 16    General Awake, alert, no distress  Abdomen Soft, non-distended, fundus firm, below umbilicus, appropriately tender  Incision  Intact, no erythema or exudate  Extremities Calves NT bilaterally     I/O last 3 completed shifts:  In: -   Out: 1950 [Urine:1950]    LABS:   Lab Results   Component Value Date    WBC 9.75 2025    HGB 10.4 (L) 2025    HCT 31.1 (L) 2025    MCV 94.2 2025     2025       Infant: male      Assessment   1.  POD 3--   2.  MO-  Lovenox qd   3.  Pp HTN likely related to anxiety--- started Lexapro yesterday.  Few mild range BP readings.  Today severe range x 2.  She complaints of anxiety.  BP wnl after resting, sleeping, pain meds.  No hx of elevated BP.  PEP wnl .      Plan:  Routine postoperative care.  D/w Dr Mckeon--- continue Lexapro.  Start labetalol 200 tid.  Pain meds scheduled.  PEP in am.        Active Problems:   None      Katrina Spears, APRN  2025 13:48 EDT

## 2025-04-19 NOTE — SIGNIFICANT NOTE
Pt called out thru emergency light, up in chair, had been eating lunch. States she feels like she is about to pass out. Cool washcloth placed on her forehead and neck, vital signs as documented. Stayed with patient for several minutes then assisted to bed to lie down. Notified APRN rounding on unit. Will recheck BP

## 2025-04-20 VITALS
RESPIRATION RATE: 16 BRPM | OXYGEN SATURATION: 95 % | SYSTOLIC BLOOD PRESSURE: 132 MMHG | HEART RATE: 71 BPM | TEMPERATURE: 98.2 F | DIASTOLIC BLOOD PRESSURE: 65 MMHG

## 2025-04-20 LAB
ALP SERPL-CCNC: 102 U/L (ref 39–117)
ALT SERPL W P-5'-P-CCNC: 17 U/L (ref 1–33)
AST SERPL-CCNC: 38 U/L (ref 1–32)
BILIRUB SERPL-MCNC: 0.2 MG/DL (ref 0–1.2)
CREAT SERPL-MCNC: 0.69 MG/DL (ref 0.57–1)
LDH SERPL-CCNC: 234 U/L (ref 135–214)
URATE SERPL-MCNC: 6.7 MG/DL (ref 2.4–5.7)

## 2025-04-20 PROCEDURE — 63710000001 ONDANSETRON ODT 4 MG TABLET DISPERSIBLE: Performed by: OBSTETRICS & GYNECOLOGY

## 2025-04-20 PROCEDURE — 82565 ASSAY OF CREATININE: CPT | Performed by: NURSE PRACTITIONER

## 2025-04-20 PROCEDURE — 83615 LACTATE (LD) (LDH) ENZYME: CPT | Performed by: NURSE PRACTITIONER

## 2025-04-20 PROCEDURE — 84460 ALANINE AMINO (ALT) (SGPT): CPT | Performed by: NURSE PRACTITIONER

## 2025-04-20 PROCEDURE — 84450 TRANSFERASE (AST) (SGOT): CPT | Performed by: NURSE PRACTITIONER

## 2025-04-20 PROCEDURE — 84075 ASSAY ALKALINE PHOSPHATASE: CPT | Performed by: NURSE PRACTITIONER

## 2025-04-20 PROCEDURE — 82247 BILIRUBIN TOTAL: CPT | Performed by: NURSE PRACTITIONER

## 2025-04-20 PROCEDURE — 84550 ASSAY OF BLOOD/URIC ACID: CPT | Performed by: NURSE PRACTITIONER

## 2025-04-20 RX ORDER — PROMETHAZINE HYDROCHLORIDE 25 MG/1
12.5-25 TABLET ORAL
Qty: 20 TABLET | Refills: 0 | Status: SHIPPED | OUTPATIENT
Start: 2025-04-20 | End: 2025-04-25

## 2025-04-20 RX ORDER — LABETALOL 200 MG/1
200 TABLET, FILM COATED ORAL EVERY 8 HOURS SCHEDULED
Qty: 90 TABLET | Refills: 0 | Status: SHIPPED | OUTPATIENT
Start: 2025-04-20

## 2025-04-20 RX ORDER — LABETALOL 200 MG/1
200 TABLET, FILM COATED ORAL EVERY 8 HOURS SCHEDULED
Qty: 90 TABLET | Refills: 0 | Status: SHIPPED | OUTPATIENT
Start: 2025-04-20 | End: 2025-04-20

## 2025-04-20 RX ORDER — OXYCODONE HYDROCHLORIDE 5 MG/1
5 TABLET ORAL EVERY 6 HOURS PRN
Qty: 12 TABLET | Refills: 0 | Status: SHIPPED | OUTPATIENT
Start: 2025-04-20 | End: 2025-04-23

## 2025-04-20 RX ORDER — ESCITALOPRAM OXALATE 10 MG/1
10 TABLET ORAL DAILY
Qty: 30 TABLET | Refills: 1 | Status: SHIPPED | OUTPATIENT
Start: 2025-04-20

## 2025-04-20 RX ORDER — IBUPROFEN 600 MG/1
600 TABLET, FILM COATED ORAL EVERY 6 HOURS
Qty: 30 TABLET | Refills: 0 | Status: SHIPPED | OUTPATIENT
Start: 2025-04-20

## 2025-04-20 RX ADMIN — IBUPROFEN 600 MG: 600 TABLET, FILM COATED ORAL at 06:42

## 2025-04-20 RX ADMIN — ACETAMINOPHEN 650 MG: 325 TABLET, FILM COATED ORAL at 03:12

## 2025-04-20 RX ADMIN — PRENATAL VITAMINS-IRON FUMARATE 27 MG IRON-FOLIC ACID 0.8 MG TABLET 1 TABLET: at 08:55

## 2025-04-20 RX ADMIN — LABETALOL HYDROCHLORIDE 200 MG: 200 TABLET, FILM COATED ORAL at 06:42

## 2025-04-20 RX ADMIN — ACETAMINOPHEN 650 MG: 325 TABLET, FILM COATED ORAL at 08:56

## 2025-04-20 RX ADMIN — ESCITALOPRAM OXALATE 10 MG: 10 TABLET ORAL at 08:55

## 2025-04-20 RX ADMIN — IBUPROFEN 600 MG: 600 TABLET, FILM COATED ORAL at 00:14

## 2025-04-20 RX ADMIN — IBUPROFEN 600 MG: 600 TABLET, FILM COATED ORAL at 12:16

## 2025-04-20 RX ADMIN — ONDANSETRON 4 MG: 4 TABLET, ORALLY DISINTEGRATING ORAL at 09:31

## 2025-04-20 RX ADMIN — DOCUSATE SODIUM 100 MG: 100 CAPSULE, LIQUID FILLED ORAL at 08:56

## 2025-04-20 NOTE — DISCHARGE SUMMARY
Discharge Summary    Date of Admission: 2025  Date of Discharge:  2025      Patient: Kathy Anthony      MR#:[id number]    Delivery Provider: Claudia Guallpa      Presenting Problem/History of Present Illness  Breech presentation [O32.1XX0]       Breech presentation         Discharge Diagnosis: csection at 37w6d    Procedures:  , Low Transverse    2025   5:44 PM       Hospital Course  Patient is a 29 y.o. female  at 37w6d with breech presentation in labor status post csection after failed version without complication. Postpartum the patient did well. She remained afebrile.  Her BP was mildly elevated a few times on day 2 then severely elevated on day 3.  Her preeclampsia labs were normal, she was asymptomatic, and she has no history of elevated blood pressure.  She did complain of significant anxiety.  She restarted Lexapro and began labetalol 200 mg tid and her BP this morning is wnl.  Postpartum preeclampsia precautions were reviewed.  She was ready for discharge on postpartum day 4.  She will RTO one week bp check and prn.    Infant:   male fetus 3599 g (7 lb 15 oz) with Apgar scores of 8 , 9  at five minutes.    Condition on Discharge:  Stable    Vital Signs  Temp:  [98.1 °F (36.7 °C)-98.3 °F (36.8 °C)] 98.2 °F (36.8 °C)  Heart Rate:  [63-79] 71  Resp:  [16-18] 16  BP: (129-166)/(65-94) 132/65    Lab Results   Component Value Date    WBC 10.09 2025    HGB 13.0 2025    HCT 39.3 2025    MCV 93.6 2025     2025       Discharge Disposition  Home or Self Care    Discharge Medications     Discharge Medications        New Medications        Instructions Start Date   escitalopram 10 MG tablet  Commonly known as: LEXAPRO   10 mg, Oral, Daily      ibuprofen 600 MG tablet  Commonly known as: ADVIL,MOTRIN   600 mg, Oral, Every 6 Hours      labetalol 200 MG tablet  Commonly known as: NORMODYNE   200 mg, Oral, Every 8 Hours Scheduled      oxyCODONE  5 MG immediate release tablet  Commonly known as: ROXICODONE   5 mg, Oral, Every 6 Hours PRN             Continue These Medications        Instructions Start Date   acetaminophen 650 MG 8 hr tablet  Commonly known as: TYLENOL   650 mg, Every 8 Hours PRN      famotidine 40 MG tablet  Commonly known as: PEPCID   40 mg, Daily      guaiFENesin 600 MG 12 hr tablet  Commonly known as: MUCINEX   1,200 mg, 2 Times Daily      ondansetron ODT 4 MG disintegrating tablet  Commonly known as: ZOFRAN-ODT   Place 1 tablet on the tongue Every 8 (Eight) Hours for nausea and vomiting. Please use sparingly during pregnancy.      Prenatal 27-1 27-1 MG tablet tablet   1 tablet, Daily             Stop These Medications      cyclobenzaprine 10 MG tablet  Commonly known as: FLEXERIL     lidocaine 5 %  Commonly known as: Lidoderm     oseltamivir 75 MG capsule  Commonly known as: TAMIFLU                Follow-up Appointments  Future Appointments   Date Time Provider Department Center   4/24/2025  1:10 PM Claudia Guallpa MD [facility]   5/1/2025  1:10 PM Claudia Guallpa MD [facility]     Additional Instructions for the Follow-ups that You Need to Schedule       Discharge Follow-up with Specified Provider: np/michellemun; 1 Week   As directed      To: np/ashmun   Follow Up: 1 Week   Follow Up Details: bp check        Discharge Follow-up with Specified Provider: eneida/np; 1 Week   As directed      To: eneida/np   Follow Up: 1 Week   Follow Up Details: bp check                MAYLIN Chavez  04/20/25  10:32 EDT  Csd

## 2025-04-21 ENCOUNTER — MATERNAL SCREENING (OUTPATIENT)
Dept: CALL CENTER | Facility: HOSPITAL | Age: 30
End: 2025-04-21
Payer: COMMERCIAL

## 2025-04-21 NOTE — OUTREACH NOTE
Maternal Screening Survey      Flowsheet Row Responses   Eligibility Eligible   Prep survey completed? Yes   Facility patient discharged from? [facility] - Registered Nurse

## 2025-04-25 ENCOUNTER — POSTPARTUM VISIT (OUTPATIENT)
Dept: OBSTETRICS AND GYNECOLOGY | Facility: CLINIC | Age: 30
End: 2025-04-25
Payer: COMMERCIAL

## 2025-04-25 VITALS
WEIGHT: 204 LBS | SYSTOLIC BLOOD PRESSURE: 126 MMHG | DIASTOLIC BLOOD PRESSURE: 90 MMHG | BODY MASS INDEX: 37.54 KG/M2 | HEIGHT: 62 IN

## 2025-04-25 DIAGNOSIS — R74.8 ELEVATED LIVER ENZYMES: ICD-10-CM

## 2025-04-25 NOTE — PROGRESS NOTES
Chief Complaint   Patient presents with    Postpartum Care       Postpartum blood pressure check visit         Kathy Anthony is a 29 y.o.  who presents today for a blood pressure check.    , Low Transverse   Information for the patient's :  Suresh Anthony [[id number]]   2025   male   Suresh Anthony   3599 g (7 lb 15 oz)   Gestational Age: 37w6d   Baby Discharged: Discharged with Mom  Delivering Physician: Claudia Guallpa MD    The patient was diagnosed with PP HTN.  The patient did go home on blood pressure medication.  She was prescribed Labetalol 200MG TID. Pt states according to her wrist BP monitor at home her BP has been 160s/100s each time she checks it. States she believes her BP monitor is malfunctioning. The patient complains of None.  The patient denies Headache, Right upper quadrant/ epigastric pain, Vision changes, and Swelling.     Patient describes her vaginal bleeding as light.  Patient is breast and bottle feeding.  Desires contraceptive methods: OCP (estrogen/progesterone) for contraception. She denies bowel or bladder issues. States she still does not have a full sensation of when she needs to urinate.     Patient reports postpartum depression. Postpartum Depression Screening Questionnaire: 8, She is being treated with Lexapro 10MG that was initiated POD2. Reports her anxiety has improved since starting Lexapro and being at home. States she still has moments of feeling panicky for no specific reason.      The additional following portions of the patient's history were reviewed and updated as appropriate: allergies, current medications, and problem list.      Review of Systems   Constitutional: Negative.    HENT: Negative.     Eyes: Negative.    Respiratory: Negative.     Cardiovascular: Negative.    Gastrointestinal: Negative.    Endocrine: Negative.    Genitourinary: Negative.    Musculoskeletal: Negative.    Skin: Negative.   "  Allergic/Immunologic: Negative.    Neurological: Negative.    Hematological: Negative.    Psychiatric/Behavioral:  The patient is nervous/anxious.      All other systems reviewed and are negative.     I have reviewed and agree with the HPI, ROS, and historical information as entered above. Claudia Guallpa MD      Objective   /90   Ht 157.5 cm (62\")   Wt 92.5 kg (204 lb)   LMP 2024   Breastfeeding Yes   BMI 37.31 kg/m²     Physical Exam  Vitals and nursing note reviewed. Exam conducted with a chaperone present.   Constitutional:       Appearance: She is well-developed.   HENT:      Head: Normocephalic and atraumatic.   Cardiovascular:      Rate and Rhythm: Normal rate and regular rhythm.   Pulmonary:      Effort: Pulmonary effort is normal.      Breath sounds: Normal breath sounds.   Abdominal:      General: A surgical scar is present. Bowel sounds are normal.      Palpations: Abdomen is soft. Abdomen is not rigid.      Comments: Clean, Dry, and Intact.  No erythema.    Musculoskeletal:      Cervical back: Normal range of motion. No muscular tenderness.   Skin:     General: Skin is warm and dry.   Neurological:      Mental Status: She is alert and oriented to person, place, and time.   Psychiatric:         Mood and Affect: Mood normal.         Behavior: Behavior normal.              Assessment and Plan    Problem List Items Addressed This Visit    None  Visit Diagnoses         Postpartum follow-up    -  Primary    Relevant Orders    AlP+ALT+AST+Creat+LD+TBili+..      Postpartum hypertension        Relevant Orders    AlP+ALT+AST+Creat+LD+TBili+..      Elevated liver enzymes        Relevant Orders    AlP+ALT+AST+Creat+LD+TBili+..            S/p    Continued pelvic rest.   Continue monitoring BP at home. Call if >140/90.  Labs today.  Prec given.  Bring in cuff to compare.  Return in about 1 week (around 2025), or With NP for bp and incision check.    Claudia Guallpa MD  2025   "

## 2025-04-26 LAB
ALP SERPL-CCNC: 122 IU/L (ref 44–121)
ALT SERPL-CCNC: 39 IU/L (ref 0–32)
AST SERPL-CCNC: 78 IU/L (ref 0–40)
BASOPHILS # BLD AUTO: 0.1 X10E3/UL (ref 0–0.2)
BASOPHILS NFR BLD AUTO: 1 %
BILIRUB SERPL-MCNC: 0.3 MG/DL (ref 0–1.2)
CREAT SERPL-MCNC: 0.74 MG/DL (ref 0.57–1)
EGFRCR SERPLBLD CKD-EPI 2021: 111 ML/MIN/1.73
EOSINOPHIL # BLD AUTO: 0.3 X10E3/UL (ref 0–0.4)
EOSINOPHIL NFR BLD AUTO: 4 %
ERYTHROCYTE [DISTWIDTH] IN BLOOD BY AUTOMATED COUNT: 14.3 % (ref 11.7–15.4)
HCT VFR BLD AUTO: 40.7 % (ref 34–46.6)
HGB BLD-MCNC: 12.9 G/DL (ref 11.1–15.9)
IMM GRANULOCYTES # BLD AUTO: 0 X10E3/UL (ref 0–0.1)
IMM GRANULOCYTES NFR BLD AUTO: 1 %
LDH SERPL L TO P-CCNC: 281 IU/L (ref 119–226)
LYMPHOCYTES # BLD AUTO: 1.9 X10E3/UL (ref 0.7–3.1)
LYMPHOCYTES NFR BLD AUTO: 22 %
MCH RBC QN AUTO: 29.9 PG (ref 26.6–33)
MCHC RBC AUTO-ENTMCNC: 31.7 G/DL (ref 31.5–35.7)
MCV RBC AUTO: 94 FL (ref 79–97)
MONOCYTES # BLD AUTO: 0.7 X10E3/UL (ref 0.1–0.9)
MONOCYTES NFR BLD AUTO: 9 %
NEUTROPHILS # BLD AUTO: 5.4 X10E3/UL (ref 1.4–7)
NEUTROPHILS NFR BLD AUTO: 63 %
PLATELET # BLD AUTO: 405 X10E3/UL (ref 150–450)
RBC # BLD AUTO: 4.31 X10E6/UL (ref 3.77–5.28)
URATE SERPL-MCNC: 6.1 MG/DL (ref 2.6–6.2)
WBC # BLD AUTO: 8.4 X10E3/UL (ref 3.4–10.8)

## 2025-04-29 ENCOUNTER — MATERNAL SCREENING (OUTPATIENT)
Dept: CALL CENTER | Facility: HOSPITAL | Age: 30
End: 2025-04-29
Payer: COMMERCIAL

## 2025-04-29 NOTE — OUTREACH NOTE
Maternal Screening Survey      Flowsheet Row Responses   Facility patient discharged from? [facility]   Attempt successful? No   Unsuccessful attempts Attempt 1              Nuris JUAREZ - Registered Nurse

## 2025-04-29 NOTE — OUTREACH NOTE
Maternal Screening Survey      Flowsheet Row Responses   Facility patient discharged from? [facility]   Attempt successful? Yes   Call start time 1505   Call end time 1507   I have been able to laugh and see the funny side of things. 0   I have looked forward with enjoyment to things. 0   I have blamed myself unnecessarily when things went wrong. 0   I have been anxious or worried for no good reason. 2   I have felt scared or panicky for no good reason. 2   Things have been getting on top of me. 0   I have been so unhappy that I have had difficulty sleeping. 0   I have felt sad or miserable. 0   I have been so unhappy that I have been crying. 0   The thought of harming myself has occurred to me. 0   [place]  Depression Scale Total 4   Did any of your parents have problems with alcohol or drug use? No   Do any of your peers have problems with alcohol or drug use? No   Does your partner have problems with alcohol or drug use? No   Before you were pregnant did you have problems with alcohol or drug use? (past) No   In the past month, did you drink beer, wine, liquor or use any other drugs? (pregnancy) No   Maternal Screening call completed Yes   Call end time 1507              Nuris JUAREZ - Registered Nurse

## 2025-04-30 ENCOUNTER — POSTPARTUM VISIT (OUTPATIENT)
Dept: OBSTETRICS AND GYNECOLOGY | Facility: CLINIC | Age: 30
End: 2025-04-30
Payer: COMMERCIAL

## 2025-04-30 VITALS
DIASTOLIC BLOOD PRESSURE: 84 MMHG | HEIGHT: 62 IN | BODY MASS INDEX: 37.73 KG/M2 | SYSTOLIC BLOOD PRESSURE: 130 MMHG | WEIGHT: 205 LBS

## 2025-04-30 RX ORDER — ACETAMINOPHEN AND CODEINE PHOSPHATE 120; 12 MG/5ML; MG/5ML
1 SOLUTION ORAL DAILY
Qty: 84 TABLET | Refills: 3 | Status: SHIPPED | OUTPATIENT
Start: 2025-04-30 | End: 2026-04-30

## 2025-04-30 NOTE — PROGRESS NOTES
"      Chief Complaint   Patient presents with    Postpartum Care       Postpartum Visit         Kathy Anthony is a 29 y.o.  who presents today for a 2 week(s) postpartum check.      C/S: breech     , Low Transverse   Information for the patient's :  Harshad Anthony [[id number]]   2025   male   Harshad Anthony   3599 g (7 lb 15 oz)   Gestational Age: 37w6d     Baby Discharged with Mom  Delivering MD: Claudia Guallpa MD.    Pregnancy complications:  PP HTN. Patient went home on Labetalol 200 MG TID.  Patient's B/P at home have been running 130's-140's/80's.    Patient reports her incision is clean, dry, intact. Patient describes vaginal bleeding as light. She is  pumping and breastfeeding . She would like to discuss the following complaints today: anxious about lab work.    She desires to discuss progesterone only pills for contraception.    Patient denies concerns for postpartum depression/anxiety. Patient denies  suicidal or homicidal ideation. Her postpartum depression screening questionnaire: 5. She is currently being treated with Lexapro. She feels it is helping.      The additional following portions of the patient's history were reviewed and updated as appropriate: allergies and current medications.      Review of Systems   Constitutional: Negative.    HENT: Negative.     Eyes: Negative.    Respiratory: Negative.     Cardiovascular: Negative.    Gastrointestinal: Negative.    Endocrine: Negative.    Genitourinary: Negative.    Musculoskeletal: Negative.    Skin: Negative.    Allergic/Immunologic: Negative.    Neurological: Negative.    Hematological: Negative.    Psychiatric/Behavioral:  The patient is nervous/anxious.        I have reviewed and agree with the HPI, ROS, and historical information as entered above. Claudia Guallpa MD      Objective   /84   Ht 157.5 cm (62\")   Wt 93 kg (205 lb)   LMP 2024   Breastfeeding Yes   BMI 37.49 kg/m² "     Physical Exam  Vitals and nursing note reviewed.   Constitutional:       Appearance: She is well-developed.   HENT:      Head: Normocephalic and atraumatic.   Pulmonary:      Effort: Pulmonary effort is normal.   Abdominal:      General: A surgical scar is present.      Palpations: Abdomen is soft. Abdomen is not rigid.      Comments: Clean, Dry, and Intact.  No erythema.    Musculoskeletal:      Cervical back: Normal range of motion.   Neurological:      Mental Status: She is alert and oriented to person, place, and time.   Psychiatric:         Mood and Affect: Mood normal.         Behavior: Behavior normal.              Assessment and Plan    Problem List Items Addressed This Visit    None  Visit Diagnoses         Postpartum hypertension    -  Primary    Relevant Orders    AlP+ALT+AST+Creat+LD+TBili+..            S/p , 2 week(s) postpartum.  Doing well.    Continued pelvic rest with a return to driving and light physical activity.  Baby doing well.  Breastfeeding going well.  No si/sx of postpartum depression  SHe is asymptomatic with mildly elevated BP - labs had showed an increase in LFTs.  Repeat today.  Contraception: contraceptive methods: Oral progesterone-only contraceptive  Return in about 4 weeks (around 2025).    Claudia Guallpa MD  2025

## 2025-05-01 LAB
ALP SERPL-CCNC: 120 U/L (ref 39–117)
ALT SERPL-CCNC: 26 U/L (ref 1–33)
AST SERPL-CCNC: 37 U/L (ref 1–32)
BASOPHILS # BLD AUTO: 0.05 10*3/MM3 (ref 0–0.2)
BASOPHILS NFR BLD AUTO: 0.7 % (ref 0–1.5)
BILIRUB SERPL-MCNC: 0.3 MG/DL (ref 0–1.2)
CREAT SERPL-MCNC: 0.59 MG/DL (ref 0.57–1)
EGFRCR SERPLBLD CKD-EPI 2021: 123.7 ML/MIN/1.73
EOSINOPHIL # BLD AUTO: 0.22 10*3/MM3 (ref 0–0.4)
EOSINOPHIL NFR BLD AUTO: 3 % (ref 0.3–6.2)
ERYTHROCYTE [DISTWIDTH] IN BLOOD BY AUTOMATED COUNT: 13.8 % (ref 12.3–15.4)
HCT VFR BLD AUTO: 38.4 % (ref 34–46.6)
HGB BLD-MCNC: 12.8 G/DL (ref 12–15.9)
IMM GRANULOCYTES # BLD AUTO: 0.04 10*3/MM3 (ref 0–0.05)
IMM GRANULOCYTES NFR BLD AUTO: 0.6 % (ref 0–0.5)
LDH SERPL L TO P-CCNC: 239 U/L (ref 135–214)
LYMPHOCYTES # BLD AUTO: 1.99 10*3/MM3 (ref 0.7–3.1)
LYMPHOCYTES NFR BLD AUTO: 27.4 % (ref 19.6–45.3)
MCH RBC QN AUTO: 31 PG (ref 26.6–33)
MCHC RBC AUTO-ENTMCNC: 33.3 G/DL (ref 31.5–35.7)
MCV RBC AUTO: 93 FL (ref 79–97)
MONOCYTES # BLD AUTO: 0.48 10*3/MM3 (ref 0.1–0.9)
MONOCYTES NFR BLD AUTO: 6.6 % (ref 5–12)
NEUTROPHILS # BLD AUTO: 4.47 10*3/MM3 (ref 1.7–7)
NEUTROPHILS NFR BLD AUTO: 61.7 % (ref 42.7–76)
NRBC BLD AUTO-RTO: 0 /100 WBC (ref 0–0.2)
PLATELET # BLD AUTO: 424 10*3/MM3 (ref 140–450)
RBC # BLD AUTO: 4.13 10*6/MM3 (ref 3.77–5.28)
URATE SERPL-MCNC: 5.9 MG/DL (ref 2.4–5.7)
WBC # BLD AUTO: 7.25 10*3/MM3 (ref 3.4–10.8)

## 2025-05-19 ENCOUNTER — TELEPHONE (OUTPATIENT)
Dept: OBSTETRICS AND GYNECOLOGY | Facility: CLINIC | Age: 30
End: 2025-05-19
Payer: COMMERCIAL

## 2025-05-19 ENCOUNTER — POSTPARTUM VISIT (OUTPATIENT)
Dept: OBSTETRICS AND GYNECOLOGY | Facility: CLINIC | Age: 30
End: 2025-05-19
Payer: COMMERCIAL

## 2025-05-19 VITALS
SYSTOLIC BLOOD PRESSURE: 128 MMHG | BODY MASS INDEX: 37.31 KG/M2 | DIASTOLIC BLOOD PRESSURE: 78 MMHG | TEMPERATURE: 99.9 F | WEIGHT: 204 LBS

## 2025-05-19 DIAGNOSIS — N61.0 ACUTE MASTITIS: Primary | ICD-10-CM

## 2025-05-19 LAB
BILIRUB BLD-MCNC: ABNORMAL MG/DL
GLUCOSE UR STRIP-MCNC: NEGATIVE MG/DL
KETONES UR QL: ABNORMAL
LEUKOCYTE EST, POC: ABNORMAL
NITRITE UR-MCNC: NEGATIVE MG/ML
PH UR: 6 [PH] (ref 5–8)
PROT UR STRIP-MCNC: ABNORMAL MG/DL
RBC # UR STRIP: NEGATIVE /UL
SP GR UR: 1.03 (ref 1–1.03)
UROBILINOGEN UR QL: NORMAL

## 2025-05-19 RX ORDER — DICLOXACILLIN SODIUM 500 MG/1
500 CAPSULE ORAL 4 TIMES DAILY
Qty: 40 CAPSULE | Refills: 0 | Status: SHIPPED | OUTPATIENT
Start: 2025-05-19 | End: 2025-05-29

## 2025-05-19 NOTE — PROGRESS NOTES
Chief Complaint   Patient presents with    Postpartum Care       Postpartum problems visit       Kathy Marie Anthony is a 29 y.o.  who is s/p , Low Transverse   Information for the patient's :  Harshad Anthony [[id number]]   2027   male   Harshad Anthony   3599 g (7 lb 15 oz)   Gestational Age: 37w6d   Baby Discharged: Discharged with Mom  Delivering Physician: Claudia Guallpa MD    She presents today for concerns with her breast..    The patient complains of issues with her breast including swelling of right breast and decreased milk supply. She reports redness, swelling, pain, and fever. Reports fever of 102.5. She reports vomiting as well.    Patient reports postpartum depression. Postpartum Depression Screening Questionnaire: 6, no treatment indicated.    The additional following portions of the patient's history were reviewed and updated as appropriate: allergies, current medications, past family history, past medical history, past social history, past surgical history, and problem list.      Review of Systems   Constitutional: Negative.    HENT: Negative.     Eyes: Negative.    Respiratory: Negative.     Cardiovascular: Negative.    Gastrointestinal: Negative.    Endocrine: Negative.    Genitourinary:  Positive for breast pain.        Postpartum  Breast swelling  Decreased milk supply   Musculoskeletal: Negative.    Skin:         Recent surgery   Allergic/Immunologic: Negative.    Neurological: Negative.    Hematological: Negative.    Psychiatric/Behavioral: Negative.         I have reviewed and agree with the HPI, ROS, and historical information as entered above. Sushant Bergman, APRN      Objective   /78   Temp 99.9 °F (37.7 °C)   Wt 92.5 kg (204 lb)   LMP 2024   Breastfeeding Yes   BMI 37.31 kg/m²     Physical Exam  Vitals and nursing note reviewed. Exam conducted with a chaperone present.   Constitutional:       Appearance: Normal  appearance.   Pulmonary:      Effort: No retractions.   Chest:      Chest wall: No mass.   Breasts:     Right: Swelling, skin change and tenderness present. No mass or nipple discharge.      Left: No mass, nipple discharge, skin change or tenderness.          Comments: Redness, swelling, warmth and streaking of right breast surrounding areola  Neurological:      Mental Status: She is alert.              Assessment and Plan    Problem List Items Addressed This Visit    None  Visit Diagnoses         Acute mastitis    -  Primary    Relevant Medications    dicloxacillin (DYNAPEN) 500 MG capsule      Postpartum follow-up          Pyrexia of unknown origin following delivery        Relevant Orders    POC Urinalysis Dipstick (Completed)    Urine Culture - Urine, Urine, Clean Catch            S/p , 4 weeks postpartum.   Acute mastitis: dicloxacillin 4 times daily for 10 days. Call back if no improvement in 48-72 hours. Warm compresses prior to pumping. Cool compresses after pumping. Tylenol and ibuprofen for pain. Pump or nurse breast until empty.    CCUA with + leuks. Will send for culture.  Continued pelvic rest.   Contraception: contraceptive methods: Abstinence  Return in about 15 days (around 6/3/2025).    Sushant Bergman, APRN  2025

## 2025-05-19 NOTE — TELEPHONE ENCOUNTER
Provider: DR. TEJADA    Caller: Kathy Anthony    Relationship to Patient: Self    Pharmacy:     Phone Number: [phone]    Reason for Call: APPOINTMENT    When was the patient last seen: 04.30.25    PATIENT SPOKE WITH THE ON CALL DOCTOR THIS MORNING AND WAS TOLD TO CALL THIS MORNING TO GET AN APPOINTMENT TO FOR RIGHT BREAST SWOLLEN, SUPPLY OF MILK HAS DECREASED, IRRITATED     PATIENT CAN BE REACHED .[phone]    THANK YOU

## 2025-05-21 LAB
BACTERIA UR CULT: NORMAL
BACTERIA UR CULT: NORMAL

## 2025-06-03 ENCOUNTER — POSTPARTUM VISIT (OUTPATIENT)
Dept: OBSTETRICS AND GYNECOLOGY | Facility: CLINIC | Age: 30
End: 2025-06-03
Payer: COMMERCIAL

## 2025-06-03 ENCOUNTER — TELEPHONE (OUTPATIENT)
Dept: OBSTETRICS AND GYNECOLOGY | Facility: CLINIC | Age: 30
End: 2025-06-03
Payer: COMMERCIAL

## 2025-06-03 VITALS — BODY MASS INDEX: 36.8 KG/M2 | HEIGHT: 62 IN | WEIGHT: 200 LBS

## 2025-06-03 DIAGNOSIS — Z48.89 POSTOPERATIVE VISIT: ICD-10-CM

## 2025-06-03 NOTE — PROGRESS NOTES
Chief Complaint   Patient presents with    Postpartum Care       Postpartum Visit         Kathy Anthony is a 29 y.o.  who presents today for a 7 week(s) postpartum check.     C/S: breech     , Low Transverse   Information for the patient's :  Harshad Anthony [[id number]]   2027   male   Harshad Anthony   3599 g (7 lb 15 oz)   Gestational Age: 37w6d       Baby Discharged: Discharged with Mom  Delivering Physician: Claudia Guallpa MD    Her pregnancy was complicated by no known issues. Patient developed PP HTN and was discharged on Labetalol 200 MG TID. Patient weaned medication to once daily. Patient has not taken her B/P lately. Patient states that she checked it once last week when she had mastitis and it was 120's/70's. The incision is healing well. Patient describes vaginal bleeding as occasional spotting.  Patient is breastfeeding.  She desires progesterone only pills for contraception. Patient started pills last Monday.     She would like to discuss the following complaints today: anxiety and depression.    Patient reports concerns for postpartum depression/anxiety. Patient denies  suicidal or homicidal ideation. Her postpartum depression screening questionnaire: 18. She is currently being treated with Lexapro 10 MG. She feels it is not helping. Patient states that it helped initially, but the anxiety and depression has progressively gotten worse.      Last Pap : 10/02/24. Results: negative. HPV: negative.   Last Completed Pap Smear            Upcoming       PAP SMEAR (Every 3 Years) Next due on 10/2/2027      10/02/2024  LIQUID-BASED PAP SMEAR WITH HPV GENOTYPING REGARDLESS OF INTERPRETATION (STEVEN,COR,MAD)    03/15/2021  Done - negative; performed through [facility]                              The additional following portions of the patient's history were reviewed and updated as appropriate: allergies and current medications.    Review of  "Systems   Constitutional: Negative.    HENT: Negative.     Eyes: Negative.    Respiratory: Negative.     Cardiovascular: Negative.    Gastrointestinal: Negative.    Endocrine: Negative.    Genitourinary: Negative.    Musculoskeletal: Negative.    Skin: Negative.    Allergic/Immunologic: Negative.    Neurological: Negative.    Hematological: Negative.    Psychiatric/Behavioral:  Positive for depressed mood. The patient is nervous/anxious.      All other systems reviewed and are negative.     I have reviewed and agree with the HPI, ROS, and historical information as entered above. Claudia Guallpa MD      Ht 157.5 cm (62\")   Wt 90.7 kg (200 lb)   LMP 2024   Breastfeeding Yes   BMI 36.58 kg/m²     Physical Exam  Vitals and nursing note reviewed. Exam conducted with a chaperone present.   Constitutional:       Appearance: She is well-developed.   HENT:      Head: Normocephalic and atraumatic.   Eyes:      Conjunctiva/sclera: Conjunctivae normal.   Neck:      Thyroid: No thyroid mass or thyromegaly.   Pulmonary:      Effort: Pulmonary effort is normal.      Breath sounds: No rhonchi.   Abdominal:      Palpations: Abdomen is soft. Abdomen is not rigid. There is no mass.      Tenderness: There is no abdominal tenderness. There is no guarding.      Hernia: No hernia is present.   Genitourinary:     Vagina: Normal.      Cervix: Normal.      Uterus: Normal.    Musculoskeletal:      Cervical back: Normal range of motion. No muscular tenderness.   Neurological:      Mental Status: She is alert and oriented to person, place, and time.   Psychiatric:         Behavior: Behavior normal.             Assessment and Plan    Problem List Items Addressed This Visit    None  Visit Diagnoses         Postpartum depression    -  Primary    Relevant Medications    Zuranolone 25 MG capsule      Postoperative visit                S/p , 6 week(s) postpartum.  Incision healing well.   Return to normal physical activity.  No " pelvic restrictions.   Baby doing well.  Bottlefeeding going well.  Signs of postpartum depression - discussed options.  She is on Lexapro 10 that we will increase to 20 and in meantime discussed Zurzuvae.  Info given and she would like to do this.  Denies SI/HI.  She is planning to make appt with counselor.   Contraception: contraceptive methods: Oral progesterone-only contraceptive  Return in about 5 weeks (around 7/8/2025).     Claudia Guallpa MD  06/03/2025

## 2025-06-03 NOTE — TELEPHONE ENCOUNTER
(Key: [id number])  Zurzuvae 25MG capsules  Form  [employer] Prescription Drug Prior Authorization Form   Plan Contact  [phone] phone  [phone] fax. Pending

## 2025-06-04 NOTE — TELEPHONE ENCOUNTER
Update on the PA for Zurzuvae. Affirmed the pharmacy benefit manager approved coverage 6/3/25 to 6/17/25 for 1 fill. I am calling [facility] to follow up. They need the pt's insurance information faxed to [phone] and the PA approval.

## 2025-06-17 RX ORDER — ESCITALOPRAM OXALATE 10 MG/1
10 TABLET ORAL DAILY
Qty: 30 TABLET | Refills: 1 | Status: SHIPPED | OUTPATIENT
Start: 2025-06-17

## 2025-07-01 RX ORDER — LABETALOL 200 MG/1
200 TABLET, FILM COATED ORAL EVERY 8 HOURS SCHEDULED
Qty: 90 TABLET | Refills: 0 | Status: SHIPPED | OUTPATIENT
Start: 2025-07-01

## 2025-07-08 ENCOUNTER — POSTPARTUM VISIT (OUTPATIENT)
Dept: OBSTETRICS AND GYNECOLOGY | Facility: CLINIC | Age: 30
End: 2025-07-08
Payer: COMMERCIAL

## 2025-07-08 VITALS
BODY MASS INDEX: 36.1 KG/M2 | WEIGHT: 196.2 LBS | SYSTOLIC BLOOD PRESSURE: 116 MMHG | DIASTOLIC BLOOD PRESSURE: 78 MMHG | HEIGHT: 62 IN

## 2025-07-08 RX ORDER — ESCITALOPRAM OXALATE 20 MG/1
20 TABLET ORAL DAILY
Qty: 30 TABLET | Refills: 11 | Status: SHIPPED | OUTPATIENT
Start: 2025-07-08

## 2025-07-08 NOTE — PROGRESS NOTES
Chief Complaint   Patient presents with    Postpartum Care       Subjective   HPI  Kathy Anthony is a 29 y.o. female, , who presents for postpartum depression follow up.      Patient was last seen on 6/3/2025 for her 7 week PP follow up. At that visit she scored a 18 on her postpartum depression screening questionnaire. She was prescribed Zurzuvae at that time. She states that she completed medication and reports minimal improvement. She Is currently taking Lexapro 10 mg and has been since delivery. She has an appointment scheduled with a therapist in September, this was the first available appointment.     Her postpartum depression questionnaire is 15 today. She reports anxiety and depression. She denies suicidal or homicidal ideation. Patient states that she has been avoiding leaving her house unless absolutely necessary due to anxiety.       Additional OB/GYN History   Current contraception: contraceptive methods: Oral progesterone-only contraceptive  Desires to: do not start contraception  Last Pap :   Last Completed Pap Smear            Upcoming       PAP SMEAR (Every 3 Years) Next due on 10/2/2027      10/02/2024  LIQUID-BASED PAP SMEAR WITH HPV GENOTYPING REGARDLESS OF INTERPRETATION (STEVEN,COR,MAD)    03/15/2021  Done - negative; performed through [facility]                          History of abnormal Pap smear: no  Last mammogram:   Last Completed Mammogram    This patient has no relevant Health Maintenance data.       Tobacco Usage?: No   OB History          4    Para   4    Term   4       0    AB   0    Living   4         SAB   0    IAB   0    Ectopic   0    Molar   0    Multiple   0    Live Births   4                Health Maintenance   Topic Date Due    Annual Gynecologic Pelvic and Breast Exam  Never done    COVID-19 Vaccine (3 - 2024-25 season) 2024    ANNUAL PHYSICAL  Never done    INFLUENZA VACCINE  10/01/2025    PAP SMEAR  10/02/2027     "TDAP/TD VACCINES (3 - Td or Tdap) 02/06/2035    HEPATITIS C SCREENING  Completed    Pneumococcal Vaccine 0-49  Aged Out       The additional following portions of the patient's history were reviewed and updated as appropriate: allergies, current medications, past family history, past medical history, past social history, past surgical history, and problem list.    Review of Systems   Constitutional: Negative.    HENT: Negative.     Eyes: Negative.    Respiratory: Negative.     Cardiovascular: Negative.    Gastrointestinal: Negative.    Endocrine: Negative.    Genitourinary: Negative.    Musculoskeletal: Negative.    Skin: Negative.    Allergic/Immunologic: Negative.    Neurological: Negative.    Hematological: Negative.    Psychiatric/Behavioral: Negative.  Positive for depressed mood.         Anxiety       I have reviewed and agree with the HPI, ROS, and historical information as entered above. Claudia Guallpa MD    Objective   /78   Ht 157.5 cm (62\")   Wt 89 kg (196 lb 3.2 oz)   LMP  (LMP Unknown)   Breastfeeding Yes   BMI 35.89 kg/m²     Physical Exam  Constitutional:       Appearance: She is well-developed.   HENT:      Head: Normocephalic.   Eyes:      Conjunctiva/sclera: Conjunctivae normal.   Pulmonary:      Effort: Pulmonary effort is normal.   Psychiatric:         Behavior: Behavior normal.         Assessment & Plan     Encounter Diagnosis   Name Primary?    Postpartum depression Yes       Plan     She is feeeling only slightly better.  The [employer] did not work.  She has appt in September with counseling and we will see if we can find something sooner.  Inc to 20 mg of Lexapro.  SHe is unable to work or socialized due to the anxiety.  She denies SI/HI and is bonding with the baby.  Consider pharmacogenetic testing  Return in about 3 months (around 10/8/2025).        Claudia Guallpa MD  07/08/2025  "

## 2025-07-25 RX ORDER — LABETALOL 200 MG/1
200 TABLET, FILM COATED ORAL EVERY 8 HOURS SCHEDULED
Qty: 90 TABLET | Refills: 0 | Status: CANCELLED | OUTPATIENT
Start: 2025-07-25

## 2025-07-31 RX ORDER — LABETALOL 200 MG/1
200 TABLET, FILM COATED ORAL EVERY 8 HOURS SCHEDULED
Qty: 90 TABLET | Refills: 0 | Status: SHIPPED | OUTPATIENT
Start: 2025-07-31

## 2025-07-31 NOTE — TELEPHONE ENCOUNTER
Ok to refill per LOS. Spoke with pt she is breast feeding, refill sent in and told to f/u in 3months with PCP